# Patient Record
Sex: FEMALE | Race: WHITE | NOT HISPANIC OR LATINO | Employment: OTHER | URBAN - METROPOLITAN AREA
[De-identification: names, ages, dates, MRNs, and addresses within clinical notes are randomized per-mention and may not be internally consistent; named-entity substitution may affect disease eponyms.]

---

## 2021-12-20 ENCOUNTER — TELEPHONE (OUTPATIENT)
Dept: GASTROENTEROLOGY | Facility: CLINIC | Age: 86
End: 2021-12-20

## 2022-09-20 ENCOUNTER — APPOINTMENT (OUTPATIENT)
Dept: RADIOLOGY | Facility: CLINIC | Age: 87
End: 2022-09-20
Payer: COMMERCIAL

## 2022-09-20 ENCOUNTER — OFFICE VISIT (OUTPATIENT)
Dept: URGENT CARE | Facility: CLINIC | Age: 87
End: 2022-09-20
Payer: COMMERCIAL

## 2022-09-20 VITALS
SYSTOLIC BLOOD PRESSURE: 183 MMHG | WEIGHT: 149.4 LBS | RESPIRATION RATE: 18 BRPM | HEART RATE: 89 BPM | DIASTOLIC BLOOD PRESSURE: 99 MMHG | HEIGHT: 64 IN | TEMPERATURE: 98.5 F | BODY MASS INDEX: 25.51 KG/M2 | OXYGEN SATURATION: 99 %

## 2022-09-20 DIAGNOSIS — R05.9 COUGH: Primary | ICD-10-CM

## 2022-09-20 DIAGNOSIS — J40 BRONCHITIS: ICD-10-CM

## 2022-09-20 DIAGNOSIS — R05.9 COUGH: ICD-10-CM

## 2022-09-20 LAB
SARS-COV-2 AG UPPER RESP QL IA: NEGATIVE
VALID CONTROL: NORMAL

## 2022-09-20 PROCEDURE — 87811 SARS-COV-2 COVID19 W/OPTIC: CPT | Performed by: EMERGENCY MEDICINE

## 2022-09-20 PROCEDURE — S9088 SERVICES PROVIDED IN URGENT: HCPCS | Performed by: EMERGENCY MEDICINE

## 2022-09-20 PROCEDURE — 71046 X-RAY EXAM CHEST 2 VIEWS: CPT

## 2022-09-20 PROCEDURE — 99203 OFFICE O/P NEW LOW 30 MIN: CPT | Performed by: EMERGENCY MEDICINE

## 2022-09-20 RX ORDER — LORATADINE 10 MG/1
10 TABLET ORAL DAILY
COMMUNITY

## 2022-09-20 RX ORDER — MENTHOL 5.8 MG/1
1 LOZENGE ORAL DAILY
COMMUNITY

## 2022-09-20 RX ORDER — LISINOPRIL 5 MG/1
5 TABLET ORAL DAILY
COMMUNITY

## 2022-09-20 RX ORDER — AZITHROMYCIN 250 MG/1
TABLET, FILM COATED ORAL
Qty: 6 TABLET | Refills: 0 | Status: SHIPPED | OUTPATIENT
Start: 2022-09-20 | End: 2022-09-24

## 2022-09-20 RX ORDER — SIMVASTATIN 10 MG
10 TABLET ORAL
COMMUNITY

## 2022-09-20 RX ORDER — OMEPRAZOLE 10 MG/1
10 CAPSULE, DELAYED RELEASE ORAL DAILY
COMMUNITY

## 2022-09-20 RX ORDER — BENZONATATE 100 MG/1
100 CAPSULE ORAL 3 TIMES DAILY PRN
Qty: 30 CAPSULE | Refills: 0 | Status: SHIPPED | OUTPATIENT
Start: 2022-09-20 | End: 2022-09-30

## 2022-09-20 RX ORDER — TOLTERODINE TARTRATE 2 MG/1
2 TABLET, EXTENDED RELEASE ORAL 2 TIMES DAILY
COMMUNITY
Start: 2022-04-04

## 2022-09-20 RX ORDER — LISINOPRIL 10 MG/1
10 TABLET ORAL DAILY
COMMUNITY

## 2022-09-20 NOTE — PROGRESS NOTES
[facility]        NAME: Juliann Salguero is a 80 y o  female  : 1935    MRN: [id number]  DATE: 2022  TIME: 8:44 PM    Assessment and Plan   Cough [R05 9]  1  Cough  Poct Covid 19 Rapid Antigen Test    XR chest pa & lateral    azithromycin (ZITHROMAX) 250 mg tablet    benzonatate (TESSALON PERLES) 100 mg capsule   2  Bronchitis       I reviewed the chest x-ray and I do not appreciate any pneumonia or other pathologic disease  Patient Instructions   Patient Instructions   1  Meds as instructed  2  F/u with PCP in 2-3 day  3  Encourage liquids and rest, especially tea/honey        Follow up with PCP in 3-5 days  Proceed to  ER if symptoms worsen  Chief Complaint     Chief Complaint   Patient presents with    Cough     Patient complaining of cough and runny nose that wont go away  Patient states this has been ongoing for 10 days  Patient states she has allergies so she figured that's what it was, but it is not improving  Patient took an at home covid swab which was negative  Patient states overall she feels okay, its just the cough that won't improve  History of Present Illness       60-year-old white female with chief complaint of a cough for over 10 days now  Patient states that she has some mucus production as well and the cough keeps coming and going  Review of Systems   Review of Systems   Constitutional: Negative for chills and fever  HENT: Positive for rhinorrhea  Negative for congestion  Eyes: Negative for discharge and visual disturbance  Respiratory: Positive for cough  Negative for shortness of breath and wheezing  Cardiovascular: Negative for chest pain and palpitations  Gastrointestinal: Negative for abdominal pain and vomiting  Endocrine: Negative for polydipsia and polyuria  Genitourinary: Negative for dysuria and hematuria  Musculoskeletal: Negative for arthralgias, gait problem and neck stiffness     Skin: Negative for rash and wound    Neurological: Negative for dizziness and headaches  Psychiatric/Behavioral: Negative for confusion and suicidal ideas  Current Medications       Current Outpatient Medications:     azithromycin (ZITHROMAX) 250 mg tablet, Take 2 tablets today then 1 tablet daily x 4 days, Disp: 6 tablet, Rfl: 0    benzonatate (TESSALON PERLES) 100 mg capsule, Take 1 capsule (100 mg total) by mouth 3 (three) times a day as needed for cough for up to 10 days, Disp: 30 capsule, Rfl: 0    estrogens, conjugated (Premarin) vaginal cream, Insert into the vagina daily, Disp: , Rfl:     lisinopril (ZESTRIL) 10 mg tablet, Take 10 mg by mouth daily, Disp: , Rfl:     lisinopril (ZESTRIL) 5 mg tablet, Take 5 mg by mouth daily, Disp: , Rfl:     loratadine (CLARITIN) 10 mg tablet, Take 10 mg by mouth daily, Disp: , Rfl:     Multiple Vitamins-Iron (QC Daily Multivitamins/Iron) TABS, Take 1 tablet by mouth daily, Disp: , Rfl:     omeprazole (PriLOSEC) 10 mg delayed release capsule, Take 10 mg by mouth daily, Disp: , Rfl:     simvastatin (ZOCOR) 10 mg tablet, Take 10 mg by mouth daily at bedtime, Disp: , Rfl:     tolterodine (DETROL) 2 mg tablet, Take 2 mg by mouth 2 (two) times a day, Disp: , Rfl:     Current Allergies     Allergies as of 09/20/2022 - Reviewed 09/20/2022   Allergen Reaction Noted    Pollen extract Cough and Other (See Comments) 08/15/2007            The following portions of the patient's history were reviewed and updated as appropriate: allergies, current medications, past family history, past medical history, past social history, past surgical history and problem list      Past Medical History:   Diagnosis Date    GERD (gastroesophageal reflux disease)     Hyperlipidemia     Hypertension        Past Surgical History:   Procedure Laterality Date    HYSTERECTOMY         History reviewed  No pertinent family history  Medications have been verified          Objective   BP (!) 183/99   Pulse 89 Temp 98 5 °F (36 9 °C)   Resp 18   Ht 5' 4" (1 626 m)   Wt 67 8 kg (149 lb 6 4 oz)   SpO2 99%   BMI 25 64 kg/m²        Physical Exam     Physical Exam  Vitals reviewed  Constitutional:       General: She is not in acute distress  Appearance: She is well-developed  She is not ill-appearing, toxic-appearing or diaphoretic  Comments: 81 yo w female sitting on the chair in NAD  Pt  Is somewhat pale  HENT:      Head: Normocephalic and atraumatic  Nose: Congestion and rhinorrhea present  Mouth/Throat:      Pharynx: Oropharynx is clear  Eyes:      General: No scleral icterus  Extraocular Movements: Extraocular movements intact  Pupils: Pupils are equal, round, and reactive to light  Cardiovascular:      Rate and Rhythm: Normal rate and regular rhythm  Heart sounds: Normal heart sounds  Pulmonary:      Effort: Pulmonary effort is normal  No respiratory distress  Breath sounds: Normal breath sounds  No stridor  No wheezing, rhonchi or rales  Chest:      Chest wall: No tenderness  Abdominal:      General: Abdomen is flat  Bowel sounds are normal  There is no distension  Palpations: Abdomen is soft  There is no shifting dullness, hepatomegaly, splenomegaly or mass  Tenderness: There is no abdominal tenderness  There is no right CVA tenderness, left CVA tenderness or guarding  Negative signs include Nagel's sign and McBurney's sign  Hernia: No hernia is present  Musculoskeletal:      Cervical back: Normal range of motion and neck supple  Skin:     General: Skin is warm and dry  Coloration: Skin is not cyanotic, jaundiced, mottled or pale  Findings: No erythema  Neurological:      General: No focal deficit present  Mental Status: She is alert and oriented to person, place, and time     Psychiatric:         Mood and Affect: Mood normal

## 2023-12-08 ENCOUNTER — OFFICE VISIT (OUTPATIENT)
Dept: URGENT CARE | Facility: CLINIC | Age: 88
End: 2023-12-08
Payer: COMMERCIAL

## 2023-12-08 VITALS
HEART RATE: 86 BPM | DIASTOLIC BLOOD PRESSURE: 80 MMHG | RESPIRATION RATE: 18 BRPM | SYSTOLIC BLOOD PRESSURE: 158 MMHG | TEMPERATURE: 97.9 F | OXYGEN SATURATION: 98 %

## 2023-12-08 DIAGNOSIS — R26.89 BALANCE PROBLEM: ICD-10-CM

## 2023-12-08 DIAGNOSIS — R42 LIGHTHEADEDNESS: Primary | ICD-10-CM

## 2023-12-08 PROBLEM — N95.2 ATROPHIC VAGINITIS: Status: ACTIVE | Noted: 2018-07-16

## 2023-12-08 PROBLEM — I12.9 HYPERTENSIVE KIDNEY DISEASE WITH STAGE 3A CHRONIC KIDNEY DISEASE (HCC): Status: ACTIVE | Noted: 2021-08-04

## 2023-12-08 PROBLEM — K22.70 BARRETT'S ESOPHAGUS WITHOUT DYSPLASIA: Status: ACTIVE | Noted: 2020-06-18

## 2023-12-08 PROBLEM — G31.9 MILD CEREBRAL ATROPHY (HCC): Status: ACTIVE | Noted: 2023-08-14

## 2023-12-08 PROBLEM — N18.31 STAGE 3A CHRONIC KIDNEY DISEASE (HCC): Chronic | Status: ACTIVE | Noted: 2021-01-11

## 2023-12-08 PROBLEM — N81.12 CYSTOCELE, LATERAL: Status: ACTIVE | Noted: 2018-07-16

## 2023-12-08 PROBLEM — E78.5 DYSLIPIDEMIA, GOAL TO BE DETERMINED: Status: ACTIVE | Noted: 2023-12-08

## 2023-12-08 PROBLEM — N18.31 HYPERTENSIVE KIDNEY DISEASE WITH STAGE 3A CHRONIC KIDNEY DISEASE (HCC): Status: ACTIVE | Noted: 2021-08-04

## 2023-12-08 PROCEDURE — S9088 SERVICES PROVIDED IN URGENT: HCPCS | Performed by: PHYSICIAN ASSISTANT

## 2023-12-08 PROCEDURE — 99215 OFFICE O/P EST HI 40 MIN: CPT | Performed by: PHYSICIAN ASSISTANT

## 2023-12-08 RX ORDER — LUTEIN 6 MG
6 TABLET ORAL DAILY
COMMUNITY

## 2023-12-08 RX ORDER — ESTRADIOL 0.1 MG/G
CREAM VAGINAL
COMMUNITY
Start: 2023-10-22

## 2023-12-08 NOTE — PROGRESS NOTES
[facility]        NAME: Shagufta Polk is a 80 y.o. female  : 1935    MRN: [id number]  DATE: 2023  TIME: 6:02 PM      Assessment and Plan     Lightheadedness [R42]  1. Lightheadedness  Transfer to other facility      2. Balance problem  Transfer to other facility        Note:  Sent patient to ED for evaluation. Patient not currently having symptoms, so physical exam is benign in office. Patient stated she probably wouldn't go to the ED, but I told her I would strongly recommend it because this could be a stroke, could cause dizziness so violent that she falls and injures herself, could be due to high BP (which can also lead to stroke), or could happen while driving which can cause other injuries as well. Patient Instructions   There are no Patient Instructions on file for this visit. Follow up with PCP in 3-5 days. Go to ER if symptoms worsen. Chief Complaint     Chief Complaint   Patient presents with    Dizziness     Pt felt very lightheaded and threw up today. Lightheadedness also happened last week, but went away. Came back today. History of Present Illness     Patient presents with 2 episodes of lightheadedness. She states the first was 10 days ago and the second was today. She states the symptoms started suddenly and she felt off balance and light-headed. Today during the episode she vomited, and she also took her BP at this time which was 200/95. She states the episodes each lasted an hour or more. Denies speech difficulty, weakness, confusion, and chest pain. Dizziness  Associated symptoms include vomiting. Pertinent negatives include no abdominal pain, arthralgias, chest pain, chills, congestion, coughing, fatigue, fever, headaches, myalgias, nausea, numbness, rash, sore throat or weakness. Review of Systems     Review of Systems   Constitutional:  Negative for chills, fatigue and fever.    HENT:  Negative for congestion, ear pain, postnasal drip, rhinorrhea, sinus pressure, sinus pain and sore throat. Eyes:  Negative for pain and visual disturbance. Respiratory:  Negative for cough, chest tightness and shortness of breath. Cardiovascular:  Negative for chest pain and palpitations. Gastrointestinal:  Positive for vomiting. Negative for abdominal pain, diarrhea and nausea. Genitourinary:  Negative for dysuria and hematuria. Musculoskeletal:  Negative for arthralgias, back pain and myalgias. Skin:  Negative for rash. Neurological:  Positive for dizziness and light-headedness. Negative for tremors, seizures, syncope, facial asymmetry, speech difficulty, weakness, numbness and headaches. Psychiatric/Behavioral:  Negative for confusion. All other systems reviewed and are negative. Current Medications       Current Outpatient Medications:     estradiol (ESTRACE) 0.1 mg/g vaginal cream, ADMINISTER 0.5 G INTO THE VAGINA ONCE A DAY ON MONDAY AND FRIDAY ONLY.  AS DIRECTED., Disp: , Rfl:     estrogens, conjugated (Premarin) vaginal cream, Insert into the vagina daily, Disp: , Rfl:     lisinopril (ZESTRIL) 10 mg tablet, Take 10 mg by mouth daily, Disp: , Rfl:     lisinopril (ZESTRIL) 5 mg tablet, Take 5 mg by mouth daily, Disp: , Rfl:     Lutein 6 MG TABS, Take 6 mg by mouth daily, Disp: , Rfl:     Multiple Vitamins-Iron (QC Daily Multivitamins/Iron) TABS, Take 1 tablet by mouth daily, Disp: , Rfl:     omeprazole (PriLOSEC) 10 mg delayed release capsule, Take 10 mg by mouth daily, Disp: , Rfl:     simvastatin (ZOCOR) 10 mg tablet, Take 10 mg by mouth daily at bedtime, Disp: , Rfl:     loratadine (CLARITIN) 10 mg tablet, Take 10 mg by mouth daily (Patient not taking: Reported on 12/8/2023), Disp: , Rfl:     tolterodine (DETROL) 2 mg tablet, Take 2 mg by mouth 2 (two) times a day (Patient not taking: Reported on 12/8/2023), Disp: , Rfl:     Current Allergies     Allergies as of 12/08/2023 - Reviewed 12/08/2023   Allergen Reaction Noted Pollen extract Cough and Other (See Comments) 08/15/2007              The following portions of the patient's history were reviewed and updated as appropriate: allergies, current medications, past family history, past medical history, past social history, past surgical history, and problem list.     Past Medical History:   Diagnosis Date    GERD (gastroesophageal reflux disease)     Hyperlipidemia     Hypertension        Past Surgical History:   Procedure Laterality Date    HYSTERECTOMY         History reviewed. No pertinent family history. Medications have been verified. Objective     /80   Pulse 86   Temp 97.9 °F (36.6 °C)   Resp 18   SpO2 98%   No LMP recorded. Physical Exam     Physical Exam  Vitals and nursing note reviewed. Constitutional:       Appearance: Normal appearance. She is normal weight. HENT:      Head: Normocephalic and atraumatic. Right Ear: Tympanic membrane, ear canal and external ear normal.      Left Ear: Tympanic membrane, ear canal and external ear normal.      Nose: Nose normal.      Mouth/Throat:      Mouth: Mucous membranes are moist.      Pharynx: Oropharynx is clear. Eyes:      Extraocular Movements: Extraocular movements intact. Conjunctiva/sclera: Conjunctivae normal.      Pupils: Pupils are equal, round, and reactive to light. Cardiovascular:      Rate and Rhythm: Normal rate and regular rhythm. Heart sounds: Normal heart sounds. Pulmonary:      Effort: Pulmonary effort is normal.      Breath sounds: Normal breath sounds. Skin:     General: Skin is warm and dry. Neurological:      General: No focal deficit present. Mental Status: She is alert and oriented to person, place, and time. Cranial Nerves: Cranial nerves 2-12 are intact. No dysarthria or facial asymmetry. Sensory: Sensation is intact. Motor: Motor function is intact. No pronator drift. Coordination: Coordination is intact.       Gait: Gait is intact.    Psychiatric:         Mood and Affect: Mood normal.         Behavior: Behavior normal.

## 2024-04-18 ENCOUNTER — APPOINTMENT (EMERGENCY)
Dept: RADIOLOGY | Facility: HOSPITAL | Age: 89
DRG: 392 | End: 2024-04-18
Payer: COMMERCIAL

## 2024-04-18 ENCOUNTER — HOSPITAL ENCOUNTER (INPATIENT)
Facility: HOSPITAL | Age: 89
LOS: 1 days | Discharge: HOME | DRG: 392 | End: 2024-04-20
Attending: EMERGENCY MEDICINE | Admitting: STUDENT IN AN ORGANIZED HEALTH CARE EDUCATION/TRAINING PROGRAM
Payer: COMMERCIAL

## 2024-04-18 DIAGNOSIS — R13.10 DIFFICULTY IN SWALLOWING: ICD-10-CM

## 2024-04-18 DIAGNOSIS — R13.19 OTHER DYSPHAGIA: ICD-10-CM

## 2024-04-18 DIAGNOSIS — R09.A2 GLOBUS SENSATION: Primary | ICD-10-CM

## 2024-04-18 DIAGNOSIS — R09.82 POSTNASAL DRIP: ICD-10-CM

## 2024-04-18 DIAGNOSIS — K22.2 SCHATZKI'S RING: ICD-10-CM

## 2024-04-18 DIAGNOSIS — R05.9 COUGH: ICD-10-CM

## 2024-04-18 PROCEDURE — 99284 EMERGENCY DEPT VISIT MOD MDM: CPT

## 2024-04-18 PROCEDURE — 0241U HB NFCT DS VIR RESP RNA 4 TRGT: CPT | Performed by: EMERGENCY MEDICINE

## 2024-04-18 PROCEDURE — 0DJ08ZZ INSPECTION OF UPPER INTESTINAL TRACT, VIA NATURAL OR ARTIFICIAL OPENING ENDOSCOPIC: ICD-10-PCS | Performed by: STUDENT IN AN ORGANIZED HEALTH CARE EDUCATION/TRAINING PROGRAM

## 2024-04-18 PROCEDURE — 99285 EMERGENCY DEPT VISIT HI MDM: CPT | Performed by: EMERGENCY MEDICINE

## 2024-04-18 PROCEDURE — 71045 X-RAY EXAM CHEST 1 VIEW: CPT

## 2024-04-19 ENCOUNTER — APPOINTMENT (EMERGENCY)
Dept: CT IMAGING | Facility: HOSPITAL | Age: 89
DRG: 392 | End: 2024-04-19
Payer: COMMERCIAL

## 2024-04-19 ENCOUNTER — ANESTHESIA EVENT (INPATIENT)
Dept: GASTROENTEROLOGY | Facility: HOSPITAL | Age: 89
DRG: 392 | End: 2024-04-19
Payer: COMMERCIAL

## 2024-04-19 ENCOUNTER — APPOINTMENT (INPATIENT)
Dept: GASTROENTEROLOGY | Facility: HOSPITAL | Age: 89
DRG: 392 | End: 2024-04-19
Payer: COMMERCIAL

## 2024-04-19 ENCOUNTER — ANESTHESIA (INPATIENT)
Dept: GASTROENTEROLOGY | Facility: HOSPITAL | Age: 89
DRG: 392 | End: 2024-04-19
Payer: COMMERCIAL

## 2024-04-19 PROBLEM — K22.2 SCHATZKI'S RING: Status: ACTIVE | Noted: 2024-04-19

## 2024-04-19 PROBLEM — J30.1 SEASONAL ALLERGIC RHINITIS DUE TO POLLEN: Status: ACTIVE | Noted: 2024-04-19

## 2024-04-19 PROBLEM — R13.19 OTHER DYSPHAGIA: Status: ACTIVE | Noted: 2024-04-19

## 2024-04-19 LAB
2HR DELTA HS TROPONIN: 0 NG/L
ALBUMIN SERPL BCP-MCNC: 3.9 G/DL (ref 3.5–5)
ALP SERPL-CCNC: 45 U/L (ref 34–104)
ALT SERPL W P-5'-P-CCNC: 11 U/L (ref 7–52)
ANION GAP SERPL CALCULATED.3IONS-SCNC: 7 MMOL/L (ref 4–13)
AST SERPL W P-5'-P-CCNC: 17 U/L (ref 13–39)
ATRIAL RATE: 84 BPM
BASOPHILS # BLD AUTO: 0.04 THOUSANDS/ÂΜL (ref 0–0.1)
BASOPHILS NFR BLD AUTO: 0 % (ref 0–1)
BILIRUB SERPL-MCNC: 0.52 MG/DL (ref 0.2–1)
BUN SERPL-MCNC: 13 MG/DL (ref 5–25)
CALCIUM SERPL-MCNC: 8.9 MG/DL (ref 8.4–10.2)
CARDIAC TROPONIN I PNL SERPL HS: 6 NG/L
CARDIAC TROPONIN I PNL SERPL HS: 6 NG/L
CHLORIDE SERPL-SCNC: 108 MMOL/L (ref 96–108)
CO2 SERPL-SCNC: 25 MMOL/L (ref 21–32)
CREAT SERPL-MCNC: 0.82 MG/DL (ref 0.6–1.3)
EOSINOPHIL # BLD AUTO: 0.06 THOUSAND/ÂΜL (ref 0–0.61)
EOSINOPHIL NFR BLD AUTO: 1 % (ref 0–6)
ERYTHROCYTE [DISTWIDTH] IN BLOOD BY AUTOMATED COUNT: 12.4 % (ref 11.6–15.1)
FLUAV RNA RESP QL NAA+PROBE: NEGATIVE
FLUBV RNA RESP QL NAA+PROBE: NEGATIVE
GFR SERPL CREATININE-BSD FRML MDRD: 64 ML/MIN/1.73SQ M
GLUCOSE SERPL-MCNC: 113 MG/DL (ref 65–140)
HCT VFR BLD AUTO: 39.4 % (ref 34.8–46.1)
HGB BLD-MCNC: 13 G/DL (ref 11.5–15.4)
IMM GRANULOCYTES # BLD AUTO: 0.03 THOUSAND/UL (ref 0–0.2)
IMM GRANULOCYTES NFR BLD AUTO: 0 % (ref 0–2)
LYMPHOCYTES # BLD AUTO: 1.28 THOUSANDS/ÂΜL (ref 0.6–4.47)
LYMPHOCYTES NFR BLD AUTO: 11 % (ref 14–44)
MCH RBC QN AUTO: 29.3 PG (ref 26.8–34.3)
MCHC RBC AUTO-ENTMCNC: 33 G/DL (ref 31.4–37.4)
MCV RBC AUTO: 89 FL (ref 82–98)
MONOCYTES # BLD AUTO: 0.92 THOUSAND/ÂΜL (ref 0.17–1.22)
MONOCYTES NFR BLD AUTO: 8 % (ref 4–12)
NEUTROPHILS # BLD AUTO: 9.26 THOUSANDS/ÂΜL (ref 1.85–7.62)
NEUTS SEG NFR BLD AUTO: 80 % (ref 43–75)
NRBC BLD AUTO-RTO: 0 /100 WBCS
P AXIS: 66 DEGREES
PLATELET # BLD AUTO: 256 THOUSANDS/UL (ref 149–390)
PMV BLD AUTO: 9.3 FL (ref 8.9–12.7)
POTASSIUM SERPL-SCNC: 4 MMOL/L (ref 3.5–5.3)
PR INTERVAL: 196 MS
PROT SERPL-MCNC: 6.4 G/DL (ref 6.4–8.4)
QRS AXIS: 56 DEGREES
QRSD INTERVAL: 76 MS
QT INTERVAL: 370 MS
QTC INTERVAL: 437 MS
RBC # BLD AUTO: 4.44 MILLION/UL (ref 3.81–5.12)
RSV RNA RESP QL NAA+PROBE: NEGATIVE
SARS-COV-2 RNA RESP QL NAA+PROBE: NEGATIVE
SODIUM SERPL-SCNC: 140 MMOL/L (ref 135–147)
T WAVE AXIS: 64 DEGREES
VENTRICULAR RATE: 84 BPM
WBC # BLD AUTO: 11.59 THOUSAND/UL (ref 4.31–10.16)

## 2024-04-19 PROCEDURE — C9113 INJ PANTOPRAZOLE SODIUM, VIA: HCPCS | Performed by: PHYSICIAN ASSISTANT

## 2024-04-19 PROCEDURE — 99232 SBSQ HOSP IP/OBS MODERATE 35: CPT

## 2024-04-19 PROCEDURE — 70491 CT SOFT TISSUE NECK W/DYE: CPT

## 2024-04-19 PROCEDURE — 84484 ASSAY OF TROPONIN QUANT: CPT | Performed by: EMERGENCY MEDICINE

## 2024-04-19 PROCEDURE — 36415 COLL VENOUS BLD VENIPUNCTURE: CPT | Performed by: EMERGENCY MEDICINE

## 2024-04-19 PROCEDURE — 96374 THER/PROPH/DIAG INJ IV PUSH: CPT

## 2024-04-19 PROCEDURE — 99222 1ST HOSP IP/OBS MODERATE 55: CPT | Performed by: INTERNAL MEDICINE

## 2024-04-19 PROCEDURE — 43249 ESOPH EGD DILATION <30 MM: CPT | Performed by: INTERNAL MEDICINE

## 2024-04-19 PROCEDURE — C1726 CATH, BAL DIL, NON-VASCULAR: HCPCS

## 2024-04-19 PROCEDURE — 80053 COMPREHEN METABOLIC PANEL: CPT | Performed by: EMERGENCY MEDICINE

## 2024-04-19 PROCEDURE — 93010 ELECTROCARDIOGRAM REPORT: CPT | Performed by: INTERNAL MEDICINE

## 2024-04-19 PROCEDURE — 93005 ELECTROCARDIOGRAM TRACING: CPT

## 2024-04-19 PROCEDURE — C9113 INJ PANTOPRAZOLE SODIUM, VIA: HCPCS | Performed by: INTERNAL MEDICINE

## 2024-04-19 PROCEDURE — 85025 COMPLETE CBC W/AUTO DIFF WBC: CPT | Performed by: EMERGENCY MEDICINE

## 2024-04-19 PROCEDURE — 99222 1ST HOSP IP/OBS MODERATE 55: CPT | Performed by: FAMILY MEDICINE

## 2024-04-19 RX ORDER — BENZONATATE 100 MG/1
100 CAPSULE ORAL ONCE
Status: COMPLETED | OUTPATIENT
Start: 2024-04-19 | End: 2024-04-19

## 2024-04-19 RX ORDER — BENZONATATE 100 MG/1
100 CAPSULE ORAL EVERY 8 HOURS
Qty: 30 CAPSULE | Refills: 0 | Status: SHIPPED | OUTPATIENT
Start: 2024-04-19

## 2024-04-19 RX ORDER — PROPOFOL 10 MG/ML
INJECTION, EMULSION INTRAVENOUS AS NEEDED
Status: DISCONTINUED | OUTPATIENT
Start: 2024-04-19 | End: 2024-04-19

## 2024-04-19 RX ORDER — FLUTICASONE PROPIONATE 50 MCG
1 SPRAY, SUSPENSION (ML) NASAL 2 TIMES DAILY
Status: DISCONTINUED | OUTPATIENT
Start: 2024-04-19 | End: 2024-04-20 | Stop reason: HOSPADM

## 2024-04-19 RX ORDER — LORATADINE 10 MG/1
10 TABLET ORAL ONCE
Status: COMPLETED | OUTPATIENT
Start: 2024-04-19 | End: 2024-04-19

## 2024-04-19 RX ORDER — LIDOCAINE HYDROCHLORIDE 20 MG/ML
INJECTION, SOLUTION EPIDURAL; INFILTRATION; INTRACAUDAL; PERINEURAL AS NEEDED
Status: DISCONTINUED | OUTPATIENT
Start: 2024-04-19 | End: 2024-04-19

## 2024-04-19 RX ORDER — ONDANSETRON 4 MG/1
4 TABLET, ORALLY DISINTEGRATING ORAL ONCE
Status: COMPLETED | OUTPATIENT
Start: 2024-04-19 | End: 2024-04-19

## 2024-04-19 RX ORDER — SODIUM CHLORIDE, SODIUM LACTATE, POTASSIUM CHLORIDE, CALCIUM CHLORIDE 600; 310; 30; 20 MG/100ML; MG/100ML; MG/100ML; MG/100ML
INJECTION, SOLUTION INTRAVENOUS CONTINUOUS PRN
Status: DISCONTINUED | OUTPATIENT
Start: 2024-04-19 | End: 2024-04-19

## 2024-04-19 RX ORDER — HEPARIN SODIUM 5000 [USP'U]/ML
5000 INJECTION, SOLUTION INTRAVENOUS; SUBCUTANEOUS EVERY 8 HOURS SCHEDULED
Status: DISCONTINUED | OUTPATIENT
Start: 2024-04-19 | End: 2024-04-20 | Stop reason: HOSPADM

## 2024-04-19 RX ORDER — SODIUM CHLORIDE, SODIUM GLUCONATE, SODIUM ACETATE, POTASSIUM CHLORIDE, MAGNESIUM CHLORIDE, SODIUM PHOSPHATE, DIBASIC, AND POTASSIUM PHOSPHATE .53; .5; .37; .037; .03; .012; .00082 G/100ML; G/100ML; G/100ML; G/100ML; G/100ML; G/100ML; G/100ML
75 INJECTION, SOLUTION INTRAVENOUS CONTINUOUS
Status: DISCONTINUED | OUTPATIENT
Start: 2024-04-19 | End: 2024-04-20 | Stop reason: HOSPADM

## 2024-04-19 RX ORDER — PANTOPRAZOLE SODIUM 40 MG/10ML
40 INJECTION, POWDER, LYOPHILIZED, FOR SOLUTION INTRAVENOUS
Status: DISCONTINUED | OUTPATIENT
Start: 2024-04-19 | End: 2024-04-19

## 2024-04-19 RX ORDER — PANTOPRAZOLE SODIUM 40 MG/10ML
40 INJECTION, POWDER, LYOPHILIZED, FOR SOLUTION INTRAVENOUS EVERY 12 HOURS SCHEDULED
Status: DISCONTINUED | OUTPATIENT
Start: 2024-04-19 | End: 2024-04-20 | Stop reason: HOSPADM

## 2024-04-19 RX ORDER — DIPHENHYDRAMINE HYDROCHLORIDE AND LIDOCAINE HYDROCHLORIDE AND ALUMINUM HYDROXIDE AND MAGNESIUM HYDRO
10 KIT ONCE
Status: COMPLETED | OUTPATIENT
Start: 2024-04-19 | End: 2024-04-19

## 2024-04-19 RX ORDER — CETIRIZINE HYDROCHLORIDE 10 MG/1
10 TABLET ORAL DAILY
Qty: 30 TABLET | Refills: 0 | Status: SHIPPED | OUTPATIENT
Start: 2024-04-19

## 2024-04-19 RX ADMIN — LIDOCAINE HYDROCHLORIDE 10 ML: 20 SOLUTION ORAL; TOPICAL at 04:32

## 2024-04-19 RX ADMIN — SODIUM CHLORIDE, SODIUM GLUCONATE, SODIUM ACETATE, POTASSIUM CHLORIDE, MAGNESIUM CHLORIDE, SODIUM PHOSPHATE, DIBASIC, AND POTASSIUM PHOSPHATE 75 ML/HR: .53; .5; .37; .037; .03; .012; .00082 INJECTION, SOLUTION INTRAVENOUS at 06:19

## 2024-04-19 RX ADMIN — PROPOFOL 30 MG: 10 INJECTION, EMULSION INTRAVENOUS at 15:44

## 2024-04-19 RX ADMIN — PROPOFOL 30 MG: 10 INJECTION, EMULSION INTRAVENOUS at 15:47

## 2024-04-19 RX ADMIN — IOHEXOL 85 ML: 350 INJECTION, SOLUTION INTRAVENOUS at 01:02

## 2024-04-19 RX ADMIN — PROPOFOL 100 MG: 10 INJECTION, EMULSION INTRAVENOUS at 15:40

## 2024-04-19 RX ADMIN — SODIUM CHLORIDE, SODIUM LACTATE, POTASSIUM CHLORIDE, AND CALCIUM CHLORIDE: .6; .31; .03; .02 INJECTION, SOLUTION INTRAVENOUS at 15:38

## 2024-04-19 RX ADMIN — PANTOPRAZOLE SODIUM 40 MG: 40 INJECTION, POWDER, FOR SOLUTION INTRAVENOUS at 21:40

## 2024-04-19 RX ADMIN — BENZONATATE 100 MG: 100 CAPSULE ORAL at 02:57

## 2024-04-19 RX ADMIN — LORATADINE 10 MG: 10 TABLET ORAL at 02:57

## 2024-04-19 RX ADMIN — GLUCAGON 1 MG: KIT at 05:36

## 2024-04-19 RX ADMIN — LIDOCAINE HYDROCHLORIDE 80 MG: 20 INJECTION, SOLUTION EPIDURAL; INFILTRATION; INTRACAUDAL; PERINEURAL at 15:40

## 2024-04-19 RX ADMIN — ONDANSETRON 4 MG: 4 TABLET, ORALLY DISINTEGRATING ORAL at 03:40

## 2024-04-19 RX ADMIN — DEXAMETHASONE SODIUM PHOSPHATE 10 MG: 10 INJECTION, SOLUTION INTRAMUSCULAR; INTRAVENOUS at 05:07

## 2024-04-19 RX ADMIN — HEPARIN SODIUM 5000 UNITS: 5000 INJECTION INTRAVENOUS; SUBCUTANEOUS at 21:40

## 2024-04-19 RX ADMIN — PANTOPRAZOLE SODIUM 40 MG: 40 INJECTION, POWDER, FOR SOLUTION INTRAVENOUS at 09:43

## 2024-04-19 NOTE — ASSESSMENT & PLAN NOTE
Difficulty swallowing after eating salmon around 6 PM.  She states she has a sensation that something is still stuck in her throat and feels like a blockage.  Patient had previous endoscopies in the past.  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  Patient tolerating secretions today  GI consulted-appreciate recommendations

## 2024-04-19 NOTE — ANESTHESIA POSTPROCEDURE EVALUATION
Post-Op Assessment Note    CV Status:  Stable  Pain Score: 0    Pain management: adequate       Mental Status:  Sleepy and arousable   Hydration Status:  Euvolemic   PONV Controlled:  Controlled   Airway Patency:  Patent     Post Op Vitals Reviewed: Yes    No anethesia notable event occurred.    Staff: CRNA, Anesthesiologist               /72 (04/19/24 1554)    Temp (!) 97.4 °F (36.3 °C) (04/19/24 1554)    Pulse 91 (04/19/24 1554)   Resp (!) 24 (04/19/24 1554)    SpO2 95 % (04/19/24 1554)

## 2024-04-19 NOTE — ANESTHESIA PREPROCEDURE EVALUATION
Procedure:  EGD    Pt denies globus feelings now    Relevant Problems   CARDIO   (+) HTN, goal below 150/90      GI/HEPATIC   (+) Other dysphagia      /RENAL   (+) Hypertensive kidney disease with stage 3a chronic kidney disease (HCC)   (+) Stage 3a chronic kidney disease (HCC)      FEN/Gastrointestinal   (+) Schatzki's ring        Physical Exam    Airway    Mallampati score: II  TM Distance: >3 FB  Neck ROM: full     Dental    upper dentures and lower dentures    Cardiovascular  Rhythm: regular, Rate: normal, Cardiovascular exam normal    Pulmonary  Pulmonary exam normal Breath sounds clear to auscultation    Other Findings  post-pubertal.      Anesthesia Plan  ASA Score- 3     Anesthesia Type- IV sedation with anesthesia with ASA Monitors.         Additional Monitors:     Airway Plan:     Comment: Discussed risks/benefits, including medication reactions, awareness, aspiration, and serious/life threatening complications.    Plan to maintain native airway with IVGA, monitored with EtCO2.       Plan Factors-Exercise tolerance (METS): >4 METS.    Chart reviewed.    Patient summary reviewed.      Patient instructed to abstain from smoking on day of procedure. Patient did not smoke on day of surgery.            Induction- intravenous.    Postoperative Plan-     Informed Consent- Anesthetic plan and risks discussed with patient.  I personally reviewed this patient with the CRNA. Discussed and agreed on the Anesthesia Plan with the CRNA..

## 2024-04-19 NOTE — CONSULTS
Consultation -  [facility]  Litzy Smith 88 y.o. female MRN: [id number]  Unit/Bed#: ED 21 Encounter: [id number]         Reason for Consult / Principal Problem: Dysphagia    HPI: Mikaela is an 88-year-old female with history of chronic dysphagia secondary to esophageal stricture and Schatzki's ring.  There is also been documentation suspecting a motility disorder.  Patient lives locally, and her son who accompanied her to the emergency room lives in [place].  Patient presented to the emergency room for globus sensation.  Patient reports for the last week, she has had increasing dysphagia and globus sensation.  Chronically, patient reports difficulty with swallowing with solids and liquids.  She makes an effort to eat soft foods.  She reports that she had a piece of salmon and French fries yesterday, and felt globus sensation with vomiting of mucus.  Patient reports that she has been able to drink water without regurgitating it back up.  However, still reports a foreign body sensation.  Patient reports that as an outpatient she takes tizanidine prior to meals to help with her dysphagia.    Patient son reports that 3 years ago, the patient had endoscopy in the [place] area.  The last endoscopy that we have in our record to review is from [facility] in 2019 revealing a tortuous esophagus, benign-appearing esophageal stenosis that was dilated at the time, and the patient does have a history of Schatzki's ring in 2018 requiring balloon dilation as well.  It does not appear that she had a esophageal manometry when I described to the patient and her son.    Review of Systems:    CONSTITUTIONAL: Denies any fever, chills, or rigors. Good appetite, and no recent weight loss.  HEENT: No earache or tinnitus. Denies hearing loss or visual disturbances.  CARDIOVASCULAR: No chest pain or palpitations.   RESPIRATORY: Denies any cough, hemoptysis, shortness of breath or dyspnea on  "exertion.  GASTROINTESTINAL: As noted in the History of Present Illness.   GENITOURINARY: No problems with urination. Denies any hematuria or dysuria.  NEUROLOGIC: No dizziness or vertigo, denies headaches.   MUSCULOSKELETAL: Denies any muscle or joint pain.   SKIN: Denies skin rashes or itching.   ENDOCRINE: Denies excessive thirst. Denies intolerance to heat or cold.  PSYCHOSOCIAL: Denies depression or anxiety. Denies any recent memory loss.       Historical Information   Past Medical History:   Diagnosis Date    GERD (gastroesophageal reflux disease)     Hyperlipidemia     Hypertension      Past Surgical History:   Procedure Laterality Date    HYSTERECTOMY       Social History   Social History     Substance and Sexual Activity   Alcohol Use Yes    Comment: very rare per pt     Social History     Substance and Sexual Activity   Drug Use Never     Social History     Tobacco Use   Smoking Status Never   Smokeless Tobacco Never     History reviewed. No pertinent family history.     Meds/Allergies     Current Facility-Administered Medications   Medication Dose Route Frequency    fluticasone (FLONASE) 50 mcg/act nasal spray 1 spray  1 spray Each Nare BID    heparin (porcine) subcutaneous injection 5,000 Units  5,000 Units Subcutaneous Q8H FAZAL    multi-electrolyte (PLASMALYTE-A/ISOLYTE-S PH 7.4) IV solution  75 mL/hr Intravenous Continuous    pantoprazole (PROTONIX) injection 40 mg  40 mg Intravenous Q12H FAZAL       Allergies   Allergen Reactions    Pollen Extract Cough and Other (See Comments)     Tree pollen cause problems with congestion ,and ears         Objective     Blood pressure 165/73, pulse 75, temperature 98.2 °F (36.8 °C), temperature source Oral, resp. rate 18, height 5' 4\" (1.626 m), weight 66.3 kg (146 lb 2.6 oz), SpO2 98%.    No intake or output data in the 24 hours ending 04/19/24 0923      PHYSICAL EXAM:      General Appearance:   Alert and oriented x 3. Cooperative, and in no respiratory distress "   HEENT:   Normocephalic, atraumatic, anicteric.     Neck:  Supple, symmetrical, trachea midline   Lungs:   Clear to auscultation bilaterally; no rales, rhonchi or wheezing; respirations unlabored    Heart::   S1 and S2 normal; regular rate and rhythm; no murmur, rub, or gallop.   Abdomen:   Soft, non-tender, non-distended; normal bowel sounds; no masses, no organomegaly    Genitalia:   Deferred    Rectal:   Deferred    Extremities:  No cyanosis, clubbing or edema    Pulses:  2+ and symmetric all extremities    Skin:  Skin color, texture, turgor normal, no rashes or lesions    Lymph nodes:  No palpable cervical or supraclavicular lymphadenopathy        Lab Results:   Results from last 7 days   Lab Units 04/19/24  0027   WBC Thousand/uL 11.59*   HEMOGLOBIN g/dL 13.0   HEMATOCRIT % 39.4   PLATELETS Thousands/uL 256   SEGS PCT % 80*   LYMPHO PCT % 11*   MONO PCT % 8   EOS PCT % 1     Results from last 7 days   Lab Units 04/19/24  0027   POTASSIUM mmol/L 4.0   CHLORIDE mmol/L 108   CO2 mmol/L 25   BUN mg/dL 13   CREATININE mg/dL 0.82   CALCIUM mg/dL 8.9   ALK PHOS U/L 45   ALT U/L 11   AST U/L 17               Imaging Studies:  XR chest 1 view portable    Result Date: 4/19/2024  Impression: No acute cardiopulmonary disease. Low lung volumes and bibasilar atelectasis. Workstation performed: [id number]     CT soft tissue neck with contrast    Result Date: 4/19/2024  Impression: No acute abnormality. No radiopaque foreign body identified as clinically questioned. Workstation performed: [id number]       ASSESSMENT and PLAN:      1) Dysphagia, history of tortuous esophagus with Schatzki's ring and esophageal stricture requiring previous dilation - Fortunately the patient is able to tolerate her own secretions and was able to drink a sip of water in front of me this morning at 9 AM without regurgitating it back up.  As we discussed, she likely has both structural abnormality of the esophagus, and a potential underlying motility  "disorder.  Typically tizanidine is not used for the esophagus given that it works more so on skeletal muscle rather than smooth muscle as I explained to the patient and her son at the bedside.  It does not appear that she has had a formal manometry.  - EGD today to investigate  - PPI twice daily IV  - NPO until after endoscopy  - As an outpatient, recommend esophageal manometry  - Patient and patient's son agree with plan    The patient was seen and examined by Dr. Camargo, all webb medical decisions were made with Dr. Camargo.  Thank you for allowing us to participate in the care of this pleasant patient.  We will follow up with you closely.    Portions of the record may have been created with voice recognition software.  Occasional wrong word or \"sound a like\" substitutions may have occurred due to the inherent limitations of voice recognition software.  Read the chart carefully and recognize, using context, where substitutions have occurred.  "

## 2024-04-19 NOTE — ASSESSMENT & PLAN NOTE
History of esophageal stricture requiring previous dilation  Last EGD 2019  Will need outpatient manometry

## 2024-04-19 NOTE — PROGRESS NOTES
Formerly [facility], later [facility]  Progress Note  Name: Litzy Smith I  MRN: [id number]  Unit/Bed#: ED 21 I Date of Admission: 4/18/2024   Date of Service: 4/19/2024 I Hospital Day: 0    Assessment/Plan   * Other dysphagia  Assessment & Plan  Difficulty swallowing after eating salmon around 6 PM.  She states she has a sensation that something is still stuck in her throat and feels like a blockage.  Patient had previous endoscopies in the past.  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  Patient tolerating secretions today  GI consulted-appreciate recommendations    Schatzki's ring  Assessment & Plan  History of esophageal stricture requiring previous dilation  Last EGD 2019  Will need outpatient manometry    Seasonal allergic rhinitis due to pollen  Assessment & Plan  Allergies acting up per patient  Start Flonase and once able to swallow can start Claritin    HTN, goal below 150/90  Assessment & Plan  At home she takes lisinopril, continue to hold as patient is n.p.o. and normotensive    Gómez's esophagus without dysplasia  Assessment & Plan  IV Protonix           VTE Pharmacologic Prophylaxis: VTE Score: 3 Moderate Risk (Score 3-4) - Pharmacological DVT Prophylaxis Ordered: heparin.    Mobility:      -[facility] Goal achieved. Continue to encourage appropriate mobility.    Patient Centered Rounds: I performed bedside rounds with nursing staff today.   Discussions with Specialists or Other Care Team Provider: CM, GI    Education and Discussions with Family / Patient: Updated  (son) at bedside.    Total Time Spent on Date of Encounter in care of patient: 35 mins. This time was spent on one or more of the following: performing physical exam; counseling and coordination of care; obtaining or reviewing history; documenting in the medical record;  reviewing/ordering tests, medications or procedures; communicating with other healthcare professionals and discussing with patient's family/caregivers.    Current Length of Stay: 0 day(s)  Current Patient Status: Observation   Certification Statement: The patient will continue to require additional inpatient hospital stay due to need for EGD and safe dispo  Discharge Plan: Anticipate discharge tomorrow to home.    Code Status: Level 1 - Full Code    Subjective:   Patient reports feeling improved admitted yesterday with swallowing.  She reports she can swallow her own secretions without any reflux.  She last reported having symptoms approximately 1 year ago however the symptoms were worse than previous events.  She currently denies any chest pain/pressure, palpitations, lightheadedness, nausea, shortness of breath, or chills.    Objective:     Vitals:   Temp (24hrs), Av.2 °F (36.8 °C), Min:98.2 °F (36.8 °C), Max:98.2 °F (36.8 °C)    Temp:  [98.2 °F (36.8 °C)] 98.2 °F (36.8 °C)  HR:  [72-87] 85  Resp:  [17-20] 18  BP: (112-168)/(67-74) 112/74  SpO2:  [96 %-98 %] 97 %  Body mass index is 25.09 kg/m².     Input and Output Summary (last 24 hours):   No intake or output data in the 24 hours ending 24 1020    Physical Exam:   Physical Exam  Vitals and nursing note reviewed.   Constitutional:       Appearance: She is normal weight.   HENT:      Head: Normocephalic.      Nose: Nose normal.      Mouth/Throat:      Mouth: Mucous membranes are moist.      Pharynx: Oropharynx is clear.   Eyes:      General: No scleral icterus.     Conjunctiva/sclera: Conjunctivae normal.      Pupils: Pupils are equal, round, and reactive to light.   Cardiovascular:      Rate and Rhythm: Normal rate and regular rhythm.      Heart sounds: No murmur heard.     No friction rub. No gallop.   Pulmonary:      Effort: Pulmonary effort is normal. No respiratory distress.      Breath sounds: Normal breath sounds. No stridor. No wheezing, rhonchi  or rales.   Abdominal:      General: Abdomen is flat.      Palpations: Abdomen is soft.   Musculoskeletal:         General: Normal range of motion.      Cervical back: Normal range of motion and neck supple.      Right lower leg: No edema.      Left lower leg: No edema.   Lymphadenopathy:      Cervical: No cervical adenopathy.   Skin:     General: Skin is warm.      Coloration: Skin is not jaundiced or pale.      Findings: No bruising, erythema or lesion.   Neurological:      General: No focal deficit present.      Mental Status: She is alert and oriented to person, place, and time. Mental status is at baseline.      Cranial Nerves: No cranial nerve deficit.      Motor: No weakness.   Psychiatric:         Mood and Affect: Mood normal.         Behavior: Behavior normal.         Thought Content: Thought content normal.          Additional Data:     Labs:  Results from last 7 days   Lab Units 04/19/24  0027   WBC Thousand/uL 11.59*   HEMOGLOBIN g/dL 13.0   HEMATOCRIT % 39.4   PLATELETS Thousands/uL 256   SEGS PCT % 80*   LYMPHO PCT % 11*   MONO PCT % 8   EOS PCT % 1     Results from last 7 days   Lab Units 04/19/24  0027   SODIUM mmol/L 140   POTASSIUM mmol/L 4.0   CHLORIDE mmol/L 108   CO2 mmol/L 25   BUN mg/dL 13   CREATININE mg/dL 0.82   ANION GAP mmol/L 7   CALCIUM mg/dL 8.9   ALBUMIN g/dL 3.9   TOTAL BILIRUBIN mg/dL 0.52   ALK PHOS U/L 45   ALT U/L 11   AST U/L 17   GLUCOSE RANDOM mg/dL 113                       Lines/Drains:  Invasive Devices       Peripheral Intravenous Line  Duration             Peripheral IV 04/19/24 Proximal;Right;Ventral (anterior) Forearm <1 day                          Imaging: Reviewed radiology reports from this admission including: CT soft tissue neck    Recent Cultures (last 7 days):         Last 24 Hours Medication List:   Current Facility-Administered Medications   Medication Dose Route Frequency Provider Last Rate    fluticasone  1 spray Each Nare BID Etienne Mace MD       heparin (porcine)  5,000 Units Subcutaneous Q8H [facility] Etienne Mace MD      multi-electrolyte  75 mL/hr Intravenous Continuous Etienne Mace MD 75 mL/hr (04/19/24 0619)    pantoprazole  40 mg Intravenous Q12H [facility] Rosy Main PA-C          Today, Patient Was Seen By: Jayesh Scott PA-C    **Please Note: This note may have been constructed using a voice recognition system.**

## 2024-04-19 NOTE — ED NOTES
Pt is afraid to go home with the feeling of the lump in her throat, she has a history of EGD, and after getting her ready for discharge she vomited several times. Pt c/o her post nasal drip, cough, and something stuck in her throat. Pt wants to be further evaluated by GI. Pt has been given water and applesauce to PO challenge and has failed.     Elsie Kothari RN  04/19/24 1198

## 2024-04-19 NOTE — ASSESSMENT & PLAN NOTE
Difficulty swallowing after eating salmon around 6 PM.  She states she has a sensation that something is still stuck in her throat and feels like a blockage.  Patient had previous endoscopies in the past.  CT of the neck does not show any radiopaque foreign body    N.p.o.  IV fluids  Consult GI

## 2024-04-19 NOTE — ED PROVIDER NOTES
"History  Chief Complaint   Patient presents with    Difficulty Swallowing     Having difficulty swallowing history of the same, and feels like she \"can't cough up her flem\". Brought in by EMS.     88-year-old female, difficulty swallowing which she has had in the past.  Also experiencing postnasal drip and throat irritation from the postnasal drip.  She has a cough as well but no fever.  No nausea or vomiting.  No throat pain.          Prior to Admission Medications   Prescriptions Last Dose Informant Patient Reported? Taking?   Lutein 6 MG TABS   Yes No   Sig: Take 6 mg by mouth daily   Multiple Vitamins-Iron (QC Daily Multivitamins/Iron) TABS   Yes No   Sig: Take 1 tablet by mouth daily   estradiol (ESTRACE) 0.1 mg/g vaginal cream   Yes No   Sig: ADMINISTER 0.5 G INTO THE VAGINA ONCE A DAY ON MONDAY AND FRIDAY ONLY. AS DIRECTED.   estrogens, conjugated (Premarin) vaginal cream  Self Yes No   Sig: Insert into the vagina daily   lisinopril (ZESTRIL) 10 mg tablet  Self Yes No   Sig: Take 10 mg by mouth daily   lisinopril (ZESTRIL) 5 mg tablet  Self Yes No   Sig: Take 5 mg by mouth daily   loratadine (CLARITIN) 10 mg tablet   Yes No   Sig: Take 10 mg by mouth daily   Patient not taking: Reported on 12/8/2023   omeprazole (PriLOSEC) 10 mg delayed release capsule  Self Yes No   Sig: Take 10 mg by mouth daily   simvastatin (ZOCOR) 10 mg tablet  Self Yes No   Sig: Take 10 mg by mouth daily at bedtime   tolterodine (DETROL) 2 mg tablet   Yes No   Sig: Take 2 mg by mouth 2 (two) times a day   Patient not taking: Reported on 12/8/2023      Facility-Administered Medications: None       Past Medical History:   Diagnosis Date    GERD (gastroesophageal reflux disease)     Hyperlipidemia     Hypertension        Past Surgical History:   Procedure Laterality Date    HYSTERECTOMY         History reviewed. No pertinent family history.  I have reviewed and agree with the history as documented.    E-Cigarette/Vaping "     E-Cigarette/Vaping Substances     Social History     Tobacco Use    Smoking status: Never    Smokeless tobacco: Never   Substance Use Topics    Alcohol use: Yes     Comment: very rare per pt    Drug use: Never       Review of Systems   Constitutional:  Positive for appetite change. Negative for chills and fever.   HENT:  Positive for postnasal drip and trouble swallowing. Negative for congestion, rhinorrhea and sore throat.    Respiratory:  Positive for cough. Negative for chest tightness and shortness of breath.    Cardiovascular:  Negative for chest pain and leg swelling.   Gastrointestinal:  Negative for abdominal pain, nausea and vomiting.   Musculoskeletal:  Negative for back pain and neck pain.   Skin:  Negative for wound.   Neurological:  Negative for dizziness and headaches.       Physical Exam  Physical Exam  Vitals reviewed.   Constitutional:       General: She is not in acute distress.     Appearance: She is well-developed. She is not diaphoretic.   HENT:      Head: Normocephalic and atraumatic.      Mouth/Throat:      Mouth: Mucous membranes are moist.   Eyes:      General: No scleral icterus.        Right eye: No discharge.         Left eye: No discharge.      Conjunctiva/sclera: Conjunctivae normal.      Pupils: Pupils are equal, round, and reactive to light.   Neck:      Vascular: No JVD.   Cardiovascular:      Rate and Rhythm: Normal rate and regular rhythm.      Heart sounds: Normal heart sounds. No murmur heard.     No friction rub. No gallop.   Pulmonary:      Effort: Pulmonary effort is normal. No respiratory distress.      Breath sounds: Normal breath sounds. No wheezing or rales.   Chest:      Chest wall: No tenderness.   Abdominal:      General: Bowel sounds are normal. There is no distension.      Palpations: Abdomen is soft.      Tenderness: There is no abdominal tenderness. There is no guarding.   Musculoskeletal:         General: No tenderness or deformity. Normal range of motion.       Cervical back: Normal range of motion and neck supple. No rigidity or tenderness.   Skin:     General: Skin is warm and dry.      Coloration: Skin is not pale.      Findings: No erythema or rash.   Neurological:      Mental Status: She is alert and oriented to person, place, and time.      Cranial Nerves: No cranial nerve deficit.   Psychiatric:         Behavior: Behavior normal.         Vital Signs  ED Triage Vitals [04/18/24 2259]   Temperature Pulse Respirations Blood Pressure SpO2   98.2 °F (36.8 °C) 87 17 168/73 98 %      Temp Source Heart Rate Source Patient Position - Orthostatic VS BP Location FiO2 (%)   Oral Monitor Sitting Right arm --      Pain Score       --           Vitals:    04/18/24 2300 04/19/24 0000 04/19/24 0130 04/19/24 0545   BP: 168/73 147/67     Pulse: 86 84 79 72   Patient Position - Orthostatic VS: Sitting Sitting Sitting Sitting         Visual Acuity      ED Medications  Medications   iohexol (OMNIPAQUE) 350 MG/ML injection (MULTI-DOSE) 85 mL (85 mL Intravenous Given 4/19/24 0102)   benzonatate (TESSALON PERLES) capsule 100 mg (100 mg Oral Given 4/19/24 0257)   loratadine (CLARITIN) tablet 10 mg (10 mg Oral Given 4/19/24 0257)   ondansetron (ZOFRAN-ODT) dispersible tablet 4 mg (4 mg Oral Given 4/19/24 0340)   diphenhydramine, lidocaine, Al/Mg hydroxide, simethicone (Magic Mouthwash) oral solution 10 mL (10 mL Oral Given 4/19/24 0432)   dexamethasone oral liquid 10 mg 1 mL (10 mg Oral Given 4/19/24 0507)   glucagon (GLUCAGEN) injection 1 mg (1 mg Intravenous Given 4/19/24 0536)       Diagnostic Studies  Results Reviewed       Procedure Component Value Units Date/Time    HS Troponin I 2hr [[id number]]  (Normal) Collected: 04/19/24 0255    Lab Status: Final result Specimen: Blood from Arm, Right Updated: 04/19/24 0327     hs TnI 2hr 6 ng/L      Delta 2hr hsTnI 0 ng/L     HS Troponin 0hr (reflex protocol) [[id number]]  (Normal) Collected: 04/19/24 0027    Lab Status: Final result Specimen: Blood  from Arm, Right Updated: 04/19/24 0058     hs TnI 0hr 6 ng/L     Comprehensive metabolic panel [[id number]] Collected: 04/19/24 0027    Lab Status: Final result Specimen: Blood from Arm, Right Updated: 04/19/24 0050     Sodium 140 mmol/L      Potassium 4.0 mmol/L      Chloride 108 mmol/L      CO2 25 mmol/L      ANION GAP 7 mmol/L      BUN 13 mg/dL      Creatinine 0.82 mg/dL      Glucose 113 mg/dL      Calcium 8.9 mg/dL      AST 17 U/L      ALT 11 U/L      Alkaline Phosphatase 45 U/L      Total Protein 6.4 g/dL      Albumin 3.9 g/dL      Total Bilirubin 0.52 mg/dL      eGFR 64 ml/min/1.73sq m     Narrative:      National Kidney Disease Foundation guidelines for Chronic Kidney Disease (CKD):     Stage 1 with normal or high GFR (GFR > 90 mL/min/1.73 square meters)    Stage 2 Mild CKD (GFR = 60-89 mL/min/1.73 square meters)    Stage 3A Moderate CKD (GFR = 45-59 mL/min/1.73 square meters)    Stage 3B Moderate CKD (GFR = 30-44 mL/min/1.73 square meters)    Stage 4 Severe CKD (GFR = 15-29 mL/min/1.73 square meters)    Stage 5 End Stage CKD (GFR <15 mL/min/1.73 square meters)  Note: GFR calculation is accurate only with a steady state creatinine    CBC and differential [[id number]]  (Abnormal) Collected: 04/19/24 0027    Lab Status: Final result Specimen: Blood from Arm, Right Updated: 04/19/24 0033     WBC 11.59 Thousand/uL      RBC 4.44 Million/uL      Hemoglobin 13.0 g/dL      Hematocrit 39.4 %      MCV 89 fL      MCH 29.3 pg      MCHC 33.0 g/dL      RDW 12.4 %      MPV 9.3 fL      Platelets 256 Thousands/uL      nRBC 0 /100 WBCs      Segmented % 80 %      Immature Grans % 0 %      Lymphocytes % 11 %      Monocytes % 8 %      Eosinophils Relative 1 %      Basophils Relative 0 %      Absolute Neutrophils 9.26 Thousands/µL      Absolute Immature Grans 0.03 Thousand/uL      Absolute Lymphocytes 1.28 Thousands/µL      Absolute Monocytes 0.92 Thousand/µL      Eosinophils Absolute 0.06 Thousand/µL      Basophils Absolute 0.04  Thousands/µL     COVID/FLU/RSV [[id number]]  (Normal) Collected: 04/18/24 2323    Lab Status: Final result Specimen: Nares from Nose Updated: 04/19/24 0008     SARS-CoV-2 Negative     INFLUENZA A PCR Negative     INFLUENZA B PCR Negative     RSV PCR Negative    Narrative:      FOR PEDIATRIC PATIENTS - copy/paste COVID Guidelines URL to browser: [email or web address]    SARS-CoV-2 assay is a Nucleic Acid Amplification assay intended for the  qualitative detection of nucleic acid from SARS-CoV-2 in nasopharyngeal  swabs. Results are for the presumptive identification of SARS-CoV-2 RNA.    Positive results are indicative of infection with SARS-CoV-2, the virus  causing COVID-19, but do not rule out bacterial infection or co-infection  with other viruses. Laboratories within the [place] and its  territories are required to report all positive results to the appropriate  public health authorities. Negative results do not preclude SARS-CoV-2  infection and should not be used as the sole basis for treatment or other  patient management decisions. Negative results must be combined with  clinical observations, patient history, and epidemiological information.  This test has not been FDA cleared or approved.    This test has been authorized by FDA under an Emergency Use Authorization  (EUA). This test is only authorized for the duration of time the  declaration that circumstances exist justifying the authorization of the  emergency use of an in vitro diagnostic tests for detection of SARS-CoV-2  virus and/or diagnosis of COVID-19 infection under section 564(b)(1) of  the Act, 21 U.S.C. 360bbb-3(b)(1), unless the authorization is terminated  or revoked sooner. The test has been validated but independent review by FDA  and CLIA is pending.    Test performed using [employer]: This RT-PCR assay targets N2,  a region unique to SARS-CoV-2. A conserved region in the  E-gene was chosen  for pan-Sarbecovirus detection which includes SARS-CoV-2.    According to CMS-2020-01-R, this platform meets the definition of high-throughput technology.                   CT soft tissue neck with contrast   ED Interpretation by Sirena Rico DO (04/19 0307)   No acute abnormality.  No radiopaque foreign body identified as clinically questioned.        Final Result by Subhash Smith MD (04/19 0246)      No acute abnormality.   No radiopaque foreign body identified as clinically questioned.            Workstation performed: [id number]         XR chest 1 view portable    (Results Pending)              Procedures  Procedures         ED Course  ED Course as of 04/19/24 0554   Fri Apr 19, 2024   0333 Delta 2hr hsTnI: 0                                             Medical Decision Making  Difficulty swallowing, globus sensation.  Cough.  Chest x-ray is normal, no evidence of pneumonia.  Cardiac evaluation is normal.  Patient is a CAT scan to rule out foreign body or ENT pathology.  No abnormality seen on CAT scan.  Patient has attempted swallow study performed however patient is unable to reliably swallow liquids.  She states that she still feels as though she is unable to swallow fluid and she is admitted for GI consult and EGD if needed.    Amount and/or Complexity of Data Reviewed  Labs: ordered. Decision-making details documented in ED Course.  Radiology: ordered.    Risk  OTC drugs.  Prescription drug management.  Decision regarding hospitalization.             Disposition  Final diagnoses:   Globus sensation   Postnasal drip   Cough   Difficulty in swallowing     Time reflects when diagnosis was documented in both MDM as applicable and the Disposition within this note       Time User Action Codes Description Comment    4/19/2024  3:17 AM Sirena Rico Add [R09.A2] Globus sensation     4/19/2024  3:18 AM Sirena Rico Add [R09.82] Postnasal drip     4/19/2024  3:20 AM  Sirena Rico Add [R05.9] Cough     4/19/2024  5:53 AM Sirena Rico Add [R13.10] Difficulty in swallowing           ED Disposition       ED Disposition   Admit    Condition   Stable    Date/Time   Fri Apr 19, 2024  5:53 AM    Comment   Case was discussed with Etienne Mace ([facility]) and the patient's admission status was agreed to be Admission Status: observation status to the service of Dr. Mace .               Follow-up Information       Follow up With Specialties Details Why Contact Info Additional Information    [facility] Schedule an appointment as soon as possible for a visit  For follow up to ensure improvement, and for further testing and treatment as needed [address] [facility] [phone] [facility] [address] [facility].  [place] [address]    [facility] Emergency Department Emergency Medicine  If symptoms worsen: choking, unable to swallow food or fluid, pain, fever, etc [address] [facility] [phone] [facility] Emergency Department, [address] [place], [place], [address]            Patient's Medications   Discharge Prescriptions    BENZONATATE (TESSALON PERLES) 100 MG CAPSULE    Take 1 capsule (100 mg total) by mouth every 8 (eight) hours As needed for cough       Start Date: 4/19/2024 End Date: --       Order Dose: 100 mg       Quantity: 30 capsule    Refills: 0    CETIRIZINE (ZYRTEC) 10 MG TABLET    Take 1 tablet (10 mg total) by mouth daily For allergic rhinitis       Start Date: 4/19/2024 End Date: --       Order Dose: 10 mg       Quantity: 30 tablet    Refills: 0       No discharge procedures on file.    PDMP Review       None            ED Provider  Electronically Signed by             Sirena Rico DO  04/19/24 0555

## 2024-04-19 NOTE — ASSESSMENT & PLAN NOTE
At home she takes lisinopril.  At the current time she cannot swallow, medication been discontinued.  Will monitor blood pressure, may need IV meds if really elevated

## 2024-04-19 NOTE — UTILIZATION REVIEW
"OBSERVATION 4/19/24 @ 0553 CONVERTED TO INPATIENT 4/19/24 @ 1023 DUE TO DYSPHAGIA REQUIRING EGD  Initial Clinical Review    Admission: Date/Time/Statement:   Admission Orders (From admission, onward)       Ordered        04/19/24 1023  INPATIENT ADMISSION  Once            04/19/24 0553  Place in Observation  Once                          Orders Placed This Encounter   Procedures    Place in Observation     Standing Status:   Standing     Number of Occurrences:   1     Order Specific Question:   Level of Care     Answer:   Med Surg [16]    INPATIENT ADMISSION     Standing Status:   Standing     Number of Occurrences:   1     Order Specific Question:   Level of Care     Answer:   Med Surg [16]     Order Specific Question:   Estimated length of stay     Answer:   More than 2 Midnights     Order Specific Question:   Certification     Answer:   I certify that inpatient services are medically necessary for this patient for a duration of greater than two midnights. See H&P and MD Progress Notes for additional information about the patient's course of treatment.     ED Arrival Information       Expected   -    Arrival   4/18/2024 22:57    Acuity   Urgent              Means of arrival   Ambulance    Escorted by   [facility]   Hospitalist    Admission type   Emergency              Arrival complaint   difficulty swallowing             Chief Complaint   Patient presents with    Difficulty Swallowing     Having difficulty swallowing history of the same, and feels like she \"can't cough up her flem\". Brought in by EMS.       Initial Presentation: 88 y.o. female to the ED from home via EMS with complaints of difficulty swallowing, post nasal drip, cough.  Admitted under observation then converted to inpatient for other dysphagia, barretts esophagus.  H/O htn, HLD, seasonal allergies. On arrival, WBcs 11.59. CT soft tissue neck shows no acute findings.     Date: 4/19   Day 2: Tolerating secretions today.   Keep " NPO.  Started on IV fluids, GI consult.  Start IV protonix.   GI consult: history of chronic dysphagia secondary to esophageal stricture and Schatzki's ring along with suspected motility disorder.  Arrives with globus sensation.  Check EGD.  Continue with PPI. Keep NPO until endoscopy.     GI consult: Continue with IV protonix.  Keep NPO.    PROCEDURE NOTE  EGD  4/19  Anesthesia Plan  ASA Score- 3      Anesthesia Type- IV sedation with anesthesia with ASA Monitors.    FINDINGS:  The upper third of the esophagus and middle third of the esophagus appeared normal.  Non-obstructing Schatzki ring in the lower third of the esophagus; dilated with balloon dilator using guidewire from 15 mm starting size to 18 mm end size. Dilation caused mucosal tears and improved passage of the scope; post-dilation mucosal tears were superficial  3 cm sliding hiatal hernia (type I hiatal hernia) without Shant lesions present, confirmed by retroflexion  The fundus of the stomach, body of the stomach, antrum, duodenal bulb, 1st part of the duodenum and 2nd part of the duodenum appeared normal.   Date: 4/20  Day 3: Has surpassed a 2nd midnight with active treatments and services.    ED Triage Vitals [04/18/24 2259]   Temperature Pulse Respirations Blood Pressure SpO2   98.2 °F (36.8 °C) 87 17 168/73 98 %      Temp Source Heart Rate Source Patient Position - Orthostatic VS BP Location FiO2 (%)   Oral Monitor Sitting Right arm --      Pain Score       --          Wt Readings from Last 1 Encounters:   04/19/24 64 kg (141 lb 3.2 oz)     Additional Vital Signs: Vital Signs (last 2 days)    Date/Time Temp Pulse Resp BP MAP (mmHg) SpO2 O2 Device Patient Position - Orthostatic VS   04/19/24 0600 -- 75 18 165/73 105 98 % None (Room air) Sitting   04/19/24 0549 -- -- -- -- -- -- None (Room air) --   04/19/24 0545 -- 72 20 -- -- 97 % None (Room air) Sitting   04/19/24 0130 -- 79 19 -- -- 96 % None (Room air) Sitting   04/19/24 0000 -- 84 17 147/67 97  97 % None (Room air) Sitting   04/18/24 2300 -- 86 18 168/73 105 97 % None (Room air) Sitting   04/18/24 2259 98.2 °F (36.8 °C) 87 17 168/73 105 98 % None (Room air) Sitting     Pertinent Labs/Diagnostic Test Results:   4/19 EKG:         Narrative & Impression    Normal sinus rhythm  Normal ECG  No previous ECGs available           CT soft tissue neck with contrast   ED Interpretation by Sirena Rico DO (04/19 0307)   No acute abnormality.  No radiopaque foreign body identified as clinically questioned.        Final Result by Subhash Smith MD (04/19 0246)      No acute abnormality.   No radiopaque foreign body identified as clinically questioned.            Workstation performed: [id number]         XR chest 1 view portable   Final Result by Jose Alfaro MD (04/19 0807)      No acute cardiopulmonary disease.      Low lung volumes and bibasilar atelectasis.            Workstation performed: [id number]           Results from last 7 days   Lab Units 04/18/24 2323   SARS-COV-2  Negative     Results from last 7 days   Lab Units 04/19/24  0027   WBC Thousand/uL 11.59*   HEMOGLOBIN g/dL 13.0   HEMATOCRIT % 39.4   PLATELETS Thousands/uL 256   TOTAL NEUT ABS Thousands/µL 9.26*         Results from last 7 days   Lab Units 04/19/24  0027   SODIUM mmol/L 140   POTASSIUM mmol/L 4.0   CHLORIDE mmol/L 108   CO2 mmol/L 25   ANION GAP mmol/L 7   BUN mg/dL 13   CREATININE mg/dL 0.82   EGFR ml/min/1.73sq m 64   CALCIUM mg/dL 8.9     Results from last 7 days   Lab Units 04/19/24  0027   AST U/L 17   ALT U/L 11   ALK PHOS U/L 45   TOTAL PROTEIN g/dL 6.4   ALBUMIN g/dL 3.9   TOTAL BILIRUBIN mg/dL 0.52         Results from last 7 days   Lab Units 04/19/24  0027   GLUCOSE RANDOM mg/dL 113           Results from last 7 days   Lab Units 04/19/24  0255 04/19/24  0027   HS TNI 0HR ng/L  --  6   HS TNI 2HR ng/L 6  --    HSTNI D2 ng/L 0  --      Results from last 7 days   Lab Units 04/18/24 2323   INFLUENZA A PCR  Negative    INFLUENZA B PCR  Negative   RSV PCR  Negative         ED Treatment:   Medication Administration from 04/18/2024 2257 to 04/19/2024 0810         Date/Time Order Dose Route Action Comments     04/19/2024 0257 EDT benzonatate (TESSALON PERLES) capsule 100 mg 100 mg Oral Given --     04/19/2024 0257 EDT loratadine (CLARITIN) tablet 10 mg 10 mg Oral Given --     04/19/2024 0340 EDT ondansetron (ZOFRAN-ODT) dispersible tablet 4 mg 4 mg Oral Given --     04/19/2024 0432 EDT diphenhydramine, lidocaine, Al/Mg hydroxide, simethicone (Magic Mouthwash) oral solution 10 mL 10 mL Oral Given --     04/19/2024 0507 EDT dexamethasone oral liquid 10 mg 1 mL 10 mg Oral Given --     04/19/2024 0536 EDT glucagon (GLUCAGEN) injection 1 mg 1 mg Intravenous Given --     04/19/2024 0619 EDT multi-electrolyte (PLASMALYTE-A/ISOLYTE-S PH 7.4) IV solution 75 mL/hr Intravenous New Bag --          Past Medical History:   Diagnosis Date    GERD (gastroesophageal reflux disease)     Hyperlipidemia     Hypertension        Admitting Diagnosis: Cough [R05.9]  Postnasal drip [R09.82]  Difficulty in swallowing [R13.10]  Schatzki's ring [K22.2]  Globus sensation [R09.A2]  Other dysphagia [R13.19]  Difficulty swallowing [R13.10]  Age/Sex: 88 y.o. female  Admission Orders:  Scheduled Medications:  fluticasone, 1 spray, Each Nare, BID  heparin (porcine), 5,000 Units, Subcutaneous, Q8H FAZAL  pantoprazole, 40 mg, Intravenous, Q12H FAZAL      Continuous IV Infusions:  multi-electrolyte, 75 mL/hr, Intravenous, Continuous      PRN Meds:       IP CONSULT TO GASTROENTEROLOGY    Network Utilization Review Department  ATTENTION: Please call with any questions or concerns to [phone] and carefully listen to the prompts so that you are directed to the right person. All voicemails are confidential.   For Discharge needs, contact Care Management DC Support Team at [phone] opt. 2  Send all requests for admission clinical reviews, approved or denied determinations  and any other requests to dedicated fax number below belonging to the campus where the patient is receiving treatment. List of dedicated fax numbers for the Facilities:  FACILITY NAME UR FAX NUMBER   ADMISSION DENIALS (Administrative/Medical Necessity) [phone]   DISCHARGE SUPPORT TEAM (NETWORK) [phone]   PARENT CHILD HEALTH (Maternity/NICU/Pediatrics) [phone]   [facility] [phone]   [facility] [phone]   [facility] [phone]   [facility] [phone]   [facility] [phone]   [facility] [phone]   [facility] [phone]   [facility] [phone]   [facility] [phone]   [facility] [phone]   [facility] [phone]   [facility] [phone]

## 2024-04-19 NOTE — CASE MANAGEMENT
Case Management Assessment & Discharge Planning Note    Patient name Litzy Smith  Location /-01 MRN [id number]  : 1935 Date 2024       Current Admission Date: 2024  Current Admission Diagnosis:Other dysphagia   Patient Active Problem List    Diagnosis Date Noted    Other dysphagia 2024    Seasonal allergic rhinitis due to pollen 2024    Schatzki's ring 2024    Dyslipidemia, goal to be determined 2023    Mild cerebral atrophy (HCC) 2023    Hypertensive kidney disease with stage 3a chronic kidney disease (HCC) 2021    Stage 3a chronic kidney disease (HCC) 2021    Gómez's esophagus without dysplasia 2020    Atrophic vaginitis 2018    Cystocele, lateral 2018    HTN, goal below 150/90 2016      LOS (days): 0  Geometric Mean LOS (GMLOS) (days): 2.6  Days to GMLOS:2.2     OBJECTIVE:    Risk of Unplanned Readmission Score: 9.53         Current admission status: Inpatient       Preferred Pharmacy:   [id number] [place] [place] - [address]  [address]  [place] [address]  Phone: [phone] Fax: [phone]    Primary Care Provider: Nikunj Rogel MD    Primary Insurance: [facility] RE[facility]  Secondary Insurance:     ASSESSMENT:  Active Health Care Proxies    There are no active Health Care Proxies on file.       Advance Directives  Does patient have a Health Care POA?: Yes  Does patient have Advance Directives?: Yes  Advance Directives: Living will, Power of  for health care, Power of  for finance  Primary Contact: Bill Smith  Son  [phone]         Readmission Root Cause  30 Day Readmission: No    Patient Information  Admitted from:: Home  Mental Status: Alert  During Assessment patient was accompanied by: Not accompanied during assessment  Assessment information provided by:: Patient  Primary Caregiver: Self  Support Systems: Self, Friend, Son  County of  Residence: [place]  What city do you live in?: [place]  Home entry access options. Select all that apply.: Stairs  Number of steps to enter home.: 3  Do the steps have railings?: No (small steps)  Type of Current Residence: [address] [place] home  Upon entering residence, is there a bathroom on the main floor (no further steps)?: Yes    Activities of Daily Living Prior to Admission  Functional Status: Independent  Completes ADLs independently?: Yes  Does patient use assisted devices?: No  Does patient currently own DME?: No  Does patient have a history of Outpatient Therapy (PT/OT)?: Yes  Does the patient have a history of Short-Term Rehab?: No  Does patient have a history of HHC?: Yes (Years ago- doesnt remember)  Does patient currently have HHC?: No         Patient Information Continued  Income Source: Pension/halfway  Does patient have prescription coverage?: Yes  Does patient receive dialysis treatments?: No  Does patient have a history of substance abuse?: No  Does patient have a history of Mental Health Diagnosis?: No         Means of Transportation  Means of Transport to Appts:: Drives Self      Social Determinants of Health (SDOH)      Flowsheet Row Most Recent Value   Housing Stability    In the last 12 months, was there a time when you were not able to pay the mortgage or rent on time? N   In the last 12 months, how many places have you lived? 1   In the last 12 months, was there a time when you did not have a steady place to sleep or slept in a shelter (including now)? N   Transportation Needs    In the past 12 months, has lack of transportation kept you from medical appointments or from getting medications? no   In the past 12 months, has lack of transportation kept you from meetings, work, or from getting things needed for daily living? No   Food Insecurity    Within the past 12 months, you worried that your food would run out before you got the money to buy more. Never true   Within the past 12 months, the  food you bought just didn't last and you didn't have money to get more. Never true   Utilities    In the past 12 months has the electric, gas, oil, or water company threatened to shut off services in your home? No            DISCHARGE DETAILS:    Discharge planning discussed with:: Patient  Freedom of Choice: Yes  Comments - Freedom of Choice: CM met with the patient to introduce self and explain role.  Patient is A&O x4 and able to make needs known ,participate in plan and make decisons regarding her care and disposition.  FOC was maiantained throughout dicussion of d/c planning resources.  the patient staes [place] many supports there including a senior group that meets 2x a week.  She has a cleaning lady and her retired son and wife visit often from the [place] area.  She only drives locally and often eats out or has food delivered as she does not like to cook.  The patient states she is very independent with all care and ADLS.  patient staes she is not intereste din any senior area activites nor does she need [place] support resources at this time.  Patient not intereste din Life Alert- wears her small cell phone around her neck all day.  CM contacted family/caregiver?: No- see comments (Patient declined)             Contacts  Patient Contacts: Bill Smith  Son  [phone]  Relationship to Patient:: Family  Reason/Outcome: Emergency Contact    Requested Home Health Care         Is the patient interested in HHC at discharge?: No    DME Referral Provided  Referral made for DME?: No    Other Referral/Resources/Interventions Provided:  Referral Comments: Patient declined any support services from CM    Would you like to participate in our [facility] service program?  : No - Declined    Treatment Team Recommendation: Home  Discharge Destination Plan:: Home  Transport at Discharge : Other (Comment) (friend)

## 2024-04-19 NOTE — H&P
[facility]  H&P  Name: Litzy Smith 88 y.o. female I MRN: [id number]  Unit/Bed#: ED 21 I Date of Admission: 4/18/2024   Date of Service: 4/19/2024 I Hospital Day: 0      Assessment/Plan   * Other dysphagia  Assessment & Plan  Difficulty swallowing after eating salmon around 6 PM.  She states she has a sensation that something is still stuck in her throat and feels like a blockage.  Patient had previous endoscopies in the past.  CT of the neck does not show any radiopaque foreign body    N.p.o.  IV fluids  Consult GI    Gómez's esophagus without dysplasia  Assessment & Plan  IV Protonix    Seasonal allergic rhinitis due to pollen  Assessment & Plan  Allergies acting up per patient  Start Flonase and once able to swallow can start Claritin    HTN, goal below 150/90  Assessment & Plan  At home she takes lisinopril.  At the current time she cannot swallow, medication been discontinued.  Will monitor blood pressure, may need IV meds if really elevated             VTE Prophylaxis: Heparin  / sequential compression device   Code Status: Level 1 - Full Code       Anticipated Length of Stay:  Patient will be admitted on an Observation basis with an anticipated length of stay of less than 2 midnights.   Justification for Hospital Stay: Please see detailed plans noted above.    Chief Complaint:     Difficulty swallowing  History of Present Illness:  Litzy Smith is a 88 y.o. female who has past medical history significant for hypertension, hyperlipidemia, seasonal allergies who presents to the emergency room due to not able to swallow.  She states that happened around 6/7 PM when she was turning salmon.  It got stuck.  She then called EMS and things got better for a while.  She decided not to come to the hospital and then it happened again and now she is back at the hospital.  She lives by herself and is independent.      Review of Systems:    Constitutional:  Denies fever or chills   Eyes:   Denies change in visual acuity   HENT: Nasal congestion, difficulty swallowing  Respiratory:  Denies cough or shortness of breath   Cardiovascular:  Denies chest pain or edema   GI:  Denies abdominal pain or bloody stools  :  Denies dysuria   Musculoskeletal:  Denies back pain or joint pain   Integument:  Denies rash   Neurologic:  Denies headache or sensory changes   Endocrine:  Denies polyuria or polydipsia   Lymphatic:  Denies swollen glands   Psychiatric:  Denies depression or anxiety     Past Medical and Surgical History:   Past Medical History:   Diagnosis Date    GERD (gastroesophageal reflux disease)     Hyperlipidemia     Hypertension      Past Surgical History:   Procedure Laterality Date    HYSTERECTOMY         Meds/Allergies:  (Not in a hospital admission)      Allergies:   Allergies   Allergen Reactions    Pollen Extract Cough and Other (See Comments)     Tree pollen cause problems with congestion ,and ears       History:  Marital Status:      Substance Use History:   Social History     Substance and Sexual Activity   Alcohol Use Yes    Comment: very rare per pt     Social History     Tobacco Use   Smoking Status Never   Smokeless Tobacco Never     Social History     Substance and Sexual Activity   Drug Use Never       Family History:  History reviewed. No pertinent family history.    Physical Exam:     Vitals:   Blood Pressure: 165/73 (04/19/24 0600)  Pulse: 75 (04/19/24 0600)  Temperature: 98.2 °F (36.8 °C) (04/18/24 2259)  Temp Source: Oral (04/18/24 2259)  Respirations: 18 (04/19/24 0600)  SpO2: 98 % (04/19/24 0600)    Constitutional:  Non-toxic appearance  Eyes:  EOMI, No scleral icterus   HENT:   oropharynx moist, external ears normal, external nose normal   Respiratory:  No respiratory distress, no wheezing   Cardiovascular:  Normal rate, no murmurs   GI:  Soft, nondistended, no guarding   :  No costovertebral angle tenderness   Musculoskeletal:  no tenderness, no deformities. Back- no  tenderness  Integument:  no jaundice, no rash   Neurologic:  Alert &awake, communicative, CN 2-12 normal,  no focal deficits noted   Psychiatric:  Speech and behavior appropriate       Lab Results: I have personally reviewed pertinent reports.   and I have personally reviewed pertinent films in PACS    Results from last 7 days   Lab Units 04/19/24  0027   WBC Thousand/uL 11.59*   HEMOGLOBIN g/dL 13.0   HEMATOCRIT % 39.4   PLATELETS Thousands/uL 256   SEGS PCT % 80*   LYMPHO PCT % 11*   MONO PCT % 8   EOS PCT % 1     Results from last 7 days   Lab Units 04/19/24  0027   POTASSIUM mmol/L 4.0   CHLORIDE mmol/L 108   CO2 mmol/L 25   BUN mg/dL 13   CREATININE mg/dL 0.82   CALCIUM mg/dL 8.9   ALK PHOS U/L 45   ALT U/L 11   AST U/L 17             Imaging: I have personally reviewed pertinent reports.   and I have personally reviewed pertinent films in PACS    CT soft tissue neck with contrast    Result Date: 4/19/2024  Narrative: CT NECK WITH CONTRAST INDICATION:   foreign body sensation. COMPARISON:  None. TECHNIQUE:  Axial, sagittal, and coronal 2D reformatted images were created from the axial source data and submitted for interpretation. Radiation dose length product (DLP) for this visit:  352 mGy-cm .  This examination, like all CT scans performed in the [facility], was performed utilizing techniques to minimize radiation dose exposure, including the use of iterative reconstruction and automated exposure control. IV Contrast:  85 mL of iohexol (OMNIPAQUE) IMAGE QUALITY:  Diagnostic. FINDINGS: VISUALIZED BRAIN PARENCHYMA:  No acute intracranial pathology of the visualized brain parenchyma. VISUALIZED ORBITS: Normal visualized orbits. PARANASAL SINUSES: Normal visualized paranasal sinuses. NASAL CAVITY AND NASOPHARYNX:  Normal. SUPRAHYOID NECK:  Normal oral cavity, tongue base, tonsillar fossa and epiglottis. INFRAHYOID NECK:  Aryepiglottic folds and piriform sinuses are normal.  Normal glottis and  subglottic airway. THYROID GLAND:  Unremarkable. PAROTID AND SUBMANDIBULAR GLANDS:  Normal. LYMPH NODES:  No pathologic or enlarged adenopathy. VASCULAR STRUCTURES:  Normal enhancement of the cervical vasculature. THORACIC INLET:  Lung apices and upper mediastinum are unremarkable. BONY STRUCTURES: There are age appropriate degenerative changes. Patient noted to be edentulous. No acute fracture or destructive osseous lesion.     Impression: No acute abnormality. No radiopaque foreign body identified as clinically questioned. Workstation performed: [id number]     MDM: Moderate  Patient requires hospitalization as she has trouble swallowing and food being stuck in the throat.  Reviewed CBC, BMP, CT of the neck  Consult GI  IV Protonix  Reviewed external notes from PCP    Epic Records Reviewed as well as Records in Care Everywhere    ** Please Note: Dragon 360 Dictation voice to text software was used in the creation of this document. **

## 2024-04-20 ENCOUNTER — PREP FOR PROCEDURE (OUTPATIENT)
Dept: GASTROENTEROLOGY | Facility: CLINIC | Age: 89
End: 2024-04-20

## 2024-04-20 VITALS
HEIGHT: 64 IN | OXYGEN SATURATION: 98 % | DIASTOLIC BLOOD PRESSURE: 71 MMHG | TEMPERATURE: 98.1 F | HEART RATE: 70 BPM | RESPIRATION RATE: 20 BRPM | SYSTOLIC BLOOD PRESSURE: 133 MMHG | BODY MASS INDEX: 24.11 KG/M2 | WEIGHT: 141.2 LBS

## 2024-04-20 DIAGNOSIS — R13.19 OTHER DYSPHAGIA: ICD-10-CM

## 2024-04-20 DIAGNOSIS — K22.2 SCHATZKI'S RING: Primary | ICD-10-CM

## 2024-04-20 LAB
ANION GAP SERPL CALCULATED.3IONS-SCNC: 6 MMOL/L (ref 4–13)
BUN SERPL-MCNC: 16 MG/DL (ref 5–25)
CALCIUM SERPL-MCNC: 8.6 MG/DL (ref 8.4–10.2)
CHLORIDE SERPL-SCNC: 108 MMOL/L (ref 96–108)
CO2 SERPL-SCNC: 27 MMOL/L (ref 21–32)
CREAT SERPL-MCNC: 0.95 MG/DL (ref 0.6–1.3)
ERYTHROCYTE [DISTWIDTH] IN BLOOD BY AUTOMATED COUNT: 12.8 % (ref 11.6–15.1)
GFR SERPL CREATININE-BSD FRML MDRD: 53 ML/MIN/1.73SQ M
GLUCOSE SERPL-MCNC: 87 MG/DL (ref 65–140)
HCT VFR BLD AUTO: 34.8 % (ref 34.8–46.1)
HGB BLD-MCNC: 12.5 G/DL (ref 11.5–15.4)
MCH RBC QN AUTO: 32.3 PG (ref 26.8–34.3)
MCHC RBC AUTO-ENTMCNC: 35.9 G/DL (ref 31.4–37.4)
MCV RBC AUTO: 90 FL (ref 82–98)
PLATELET # BLD AUTO: 293 THOUSANDS/UL (ref 149–390)
PMV BLD AUTO: 11 FL (ref 8.9–12.7)
POTASSIUM SERPL-SCNC: 4.1 MMOL/L (ref 3.5–5.3)
RBC # BLD AUTO: 3.87 MILLION/UL (ref 3.81–5.12)
SODIUM SERPL-SCNC: 141 MMOL/L (ref 135–147)
WBC # BLD AUTO: 7.26 THOUSAND/UL (ref 4.31–10.16)

## 2024-04-20 PROCEDURE — 80048 BASIC METABOLIC PNL TOTAL CA: CPT | Performed by: INTERNAL MEDICINE

## 2024-04-20 PROCEDURE — 99239 HOSP IP/OBS DSCHRG MGMT >30: CPT

## 2024-04-20 PROCEDURE — 85027 COMPLETE CBC AUTOMATED: CPT | Performed by: INTERNAL MEDICINE

## 2024-04-20 PROCEDURE — C9113 INJ PANTOPRAZOLE SODIUM, VIA: HCPCS | Performed by: INTERNAL MEDICINE

## 2024-04-20 RX ADMIN — PANTOPRAZOLE SODIUM 40 MG: 40 INJECTION, POWDER, FOR SOLUTION INTRAVENOUS at 09:43

## 2024-04-20 RX ADMIN — HEPARIN SODIUM 5000 UNITS: 5000 INJECTION INTRAVENOUS; SUBCUTANEOUS at 05:33

## 2024-04-20 RX ADMIN — SODIUM CHLORIDE, SODIUM GLUCONATE, SODIUM ACETATE, POTASSIUM CHLORIDE, MAGNESIUM CHLORIDE, SODIUM PHOSPHATE, DIBASIC, AND POTASSIUM PHOSPHATE 75 ML/HR: .53; .5; .37; .037; .03; .012; .00082 INJECTION, SOLUTION INTRAVENOUS at 05:33

## 2024-04-20 NOTE — ASSESSMENT & PLAN NOTE
History of esophageal stricture requiring previous dilation  Seen on inpatient EGD  Will need outpatient manometry

## 2024-04-20 NOTE — ASSESSMENT & PLAN NOTE
Difficulty swallowing after eating salmon around 6 PM.  She states she has a sensation that something is still stuck in her throat and feels like a blockage.  Patient had previous endoscopies in the past.  history of tortuous esophagus with Schatzki's ring and esophageal stricture requiring previous dilation   CT of the neck does not show any radiopaque foreign body  EGD showed evidence of Schatzki's ring, GI recommending outpatient esophageal manometry  Patient tolerating secretions and diet today

## 2024-04-20 NOTE — UTILIZATION REVIEW
NOTIFICATION OF INPATIENT ADMISSION   AUTHORIZATION REQUEST   SERVICING FACILITY:   [place] [address]  Tax ID: [id number]  NPI: [id number] ATTENDING PROVIDER:  Attending Name and NPI#: Stewart DENISE Md [[id number]]  Address: [address] [place] [address]  Phone: [phone]     ADMISSION INFORMATION:  Place of Service: Inpatient Acute [facility]  Place of Service Code: 21  Inpatient Admission Date/Time: 4/19/24 10:23 AM  Discharge Date/Time: No discharge date for patient encounter.  Admitting Diagnosis Code/Description:  Cough [R05.9]  Postnasal drip [R09.82]  Difficulty in swallowing [R13.10]  Schatzki's ring [K22.2]  Globus sensation [R09.A2]  Other dysphagia [R13.19]  Difficulty swallowing [R13.10]     UTILIZATION REVIEW CONTACT:  Christina Rodriges, Utilization   Network Utilization Review Department  Phone: [phone]  Fax [phone]  Email: [email or web address]  Contact for approvals/pending authorizations, clinical reviews, and discharge.     PHYSICIAN ADVISORY SERVICES:  Medical Necessity Denial & [facility] Review  Phone: [phone]  Fax: [phone]  Email: [email or web address]     DISCHARGE SUPPORT TEAM:  For Patients Discharge Needs & Updates  Phone: [phone] opt. 2 Fax: [phone]  Email: [email or web address]

## 2024-04-20 NOTE — PLAN OF CARE
Problem: Potential for Falls  Goal: Patient will remain free of falls  Description: INTERVENTIONS:  - Educate patient/family on patient safety including physical limitations  - Instruct patient to call for assistance with activity   - Consult OT/PT to assist with strengthening/mobility   - Keep Call bell within reach  - Keep bed low and locked with side rails adjusted as appropriate  - Keep care items and personal belongings within reach  - Initiate and maintain comfort rounds  - Make Fall Risk Sign visible to staff  - Apply yellow socks and bracelet for high fall risk patients  - Consider moving patient to room near nurses station  Outcome: Progressing     Problem: GASTROINTESTINAL - ADULT  Goal: Maintains adequate nutritional intake  Description: INTERVENTIONS:  - Monitor percentage of each meal consumed  - Identify factors contributing to decreased intake, treat as appropriate  - Assist with meals as needed  - Monitor I&O, weight, and lab values if indicated  - Obtain nutrition services referral as needed  Outcome: Progressing

## 2024-04-20 NOTE — DISCHARGE INSTR - AVS FIRST PAGE
Please follow-up with the outpatient GI team.  They have ordered an esophageal manometry.  Please additionally follow-up with your PCP for continuity care within 1 week.

## 2024-04-20 NOTE — DISCHARGE SUMMARY
Critical access hospital  Discharge- Litzy Smith 1935, 88 y.o. female MRN: [id number]  Unit/Bed#: [facility] Encounter: [id number]  Primary Care Provider: Nikunj Rogel MD   Date and time admitted to hospital: 4/18/2024 10:57 PM    * Other dysphagia  Assessment & Plan  Difficulty swallowing after eating salmon around 6 PM.  She states she has a sensation that something is still stuck in her throat and feels like a blockage.  Patient had previous endoscopies in the past.  history of tortuous esophagus with Schatzki's ring and esophageal stricture requiring previous dilation   CT of the neck does not show any radiopaque foreign body  EGD showed evidence of Schatzki's ring, GI recommending outpatient esophageal manometry  Patient tolerating secretions and diet today      Schatzki's ring  Assessment & Plan  History of esophageal stricture requiring previous dilation  Seen on inpatient EGD  Will need outpatient manometry    Seasonal allergic rhinitis due to pollen  Assessment & Plan  Allergies acting up per patient  Start Flonase and once able to swallow can start Claritin    HTN, goal below 150/90  Assessment & Plan  Takes lisinopril, continue    Gómez's esophagus without dysplasia  Assessment & Plan  IV Protonix      Medical Problems       Resolved Problems  Date Reviewed: 4/19/2024   None       Discharging Physician / Practitioner: Jayesh Scott PA-C  PCP: Nikunj Rogel MD  Admission Date:   Admission Orders (From admission, onward)       Ordered        04/19/24 1023  INPATIENT ADMISSION  Once            04/19/24 0553  Place in Observation  Once                          Discharge Date: 04/20/24    Consultations During Hospital Stay:  GI    Procedures Performed:   EGD  Impression: Schatzki's ring status post 15 mm to 18 mm TTS dilation 3 cm hiatal hernia     XR chest 1 view portable  Impression: No acute cardiopulmonary disease. Low lung volumes and  "bibasilar atelectasis    CT soft tissue neck with contrast  Impression: No acute abnormality. No radiopaque foreign body identified as clinically questioned      Significant Findings / Test Results:   COVID/flu/RSV negative  BMP within normal    Incidental Findings:   None      Test Results Pending at Discharge (will require follow up):   None     Outpatient Tests Requested:  None    Complications: None    Reason for Admission: Dysphagia    Hospital Course:   Litzy Smith is a 88 y.o. female patient who originally presented to the hospital on 4/18/2024 due to patient reported prior to admission she had difficulty swallowing food and secretions.  On admission the GI team was consulted who ultimately performed an EGD on 4/19.  The EGD showed evidence of a Schatzki's ring.  The GI team is recommending an outpatient esophageal manometry.  At this time the patient can tolerate secretions and an adequate oral diet and can be discharged home for further outpatient care.  At this time patient is medically stable for discharge and agreeable to plan at discharge.  For further information in regards to course of hospitalization, please see notes attached.      Please see above list of diagnoses and related plan for additional information.     Condition at Discharge: good    Discharge Day Visit / Exam:   Subjective: Patient reports feeling well and would like to be discharged home.  She currently denies any chest pain/pressure, palpitations, lightheadedness, nausea, shortness breath, or chills.  Vitals: Blood Pressure: 133/71 (04/20/24 0657)  Pulse: 70 (04/20/24 0657)  Temperature: 98.1 °F (36.7 °C) (04/20/24 0657)  Temp Source: Temporal (04/19/24 1554)  Respirations: 20 (04/19/24 1605)  Height: 5' 4\" (162.6 cm) (04/19/24 0618)  Weight - Scale: 64 kg (141 lb 3.2 oz) (04/19/24 1229)  SpO2: 98 % (04/20/24 0657)  Exam:   Physical Exam  Vitals and nursing note reviewed.   Constitutional:       Appearance: She is normal weight. "   HENT:      Head: Normocephalic.      Nose: Nose normal.      Mouth/Throat:      Mouth: Mucous membranes are moist.      Pharynx: Oropharynx is clear.   Eyes:      General: No scleral icterus.     Conjunctiva/sclera: Conjunctivae normal.      Pupils: Pupils are equal, round, and reactive to light.   Cardiovascular:      Rate and Rhythm: Normal rate and regular rhythm.      Heart sounds: No murmur heard.     No friction rub. No gallop.   Pulmonary:      Effort: Pulmonary effort is normal. No respiratory distress.      Breath sounds: Normal breath sounds. No stridor. No wheezing, rhonchi or rales.   Abdominal:      General: Abdomen is flat.      Palpations: Abdomen is soft.   Musculoskeletal:         General: Normal range of motion.      Cervical back: Normal range of motion and neck supple.      Right lower leg: No edema.      Left lower leg: No edema.   Lymphadenopathy:      Cervical: No cervical adenopathy.   Skin:     General: Skin is warm.      Coloration: Skin is not jaundiced or pale.      Findings: No bruising, erythema or lesion.   Neurological:      General: No focal deficit present.      Mental Status: She is alert and oriented to person, place, and time. Mental status is at baseline.      Cranial Nerves: No cranial nerve deficit.      Motor: No weakness.   Psychiatric:         Mood and Affect: Mood normal.         Behavior: Behavior normal.         Thought Content: Thought content normal.          Discussion with Family: Patient declined call to .     Discharge instructions/Information to patient and family:   See after visit summary for information provided to patient and family.      Provisions for Follow-Up Care:  See after visit summary for information related to follow-up care and any pertinent home health orders.      Mobility at time of Discharge:   Basic Mobility Inpatient Raw Score: 24  JH-HLM Goal: 8: Walk 250 feet or more  JH-HLM Achieved: 7: Walk 25 feet or more        Disposition:   Home    Planned Readmission: No     Discharge Statement:  I spent 60 minutes discharging the patient. This time was spent on the day of discharge. I had direct contact with the patient on the day of discharge. Greater than 50% of the total time was spent examining patient, answering all patient questions, arranging and discussing plan of care with patient as well as directly providing post-discharge instructions.  Additional time then spent on discharge activities.    Discharge Medications:  See after visit summary for reconciled discharge medications provided to patient and/or family.      **Please Note: This note may have been constructed using a voice recognition system**

## 2024-04-22 ENCOUNTER — TELEPHONE (OUTPATIENT)
Dept: GASTROENTEROLOGY | Facility: CLINIC | Age: 89
End: 2024-04-22

## 2024-04-22 NOTE — TELEPHONE ENCOUNTER
----- Message from Rosy Main PA-C sent at 4/19/2024  4:09 PM EDT -----  Hi! Non emergent message!     I think I sent this message twice by accident, this is the most up to date message because I ended up talking to the son.     This is an 88 year old female with dysphagia from Schatki's ring and suspected underlying motility disorder. I called her son on Friday to provide results. He lives in [facility]. He asked if we could help the patient schedule her manometry at [place].     Thank you,    Eli

## 2024-04-22 NOTE — TELEPHONE ENCOUNTER
Mikaela called back and said she has no way of getting to [facility] for this study and does not want to schedule it now. She is following up with her primary care doctor and will decide next steps with them.